# Patient Record
Sex: MALE | Race: WHITE | NOT HISPANIC OR LATINO | Employment: FULL TIME | URBAN - METROPOLITAN AREA
[De-identification: names, ages, dates, MRNs, and addresses within clinical notes are randomized per-mention and may not be internally consistent; named-entity substitution may affect disease eponyms.]

---

## 2022-07-26 ENCOUNTER — HOSPITAL ENCOUNTER (EMERGENCY)
Facility: MEDICAL CENTER | Age: 57
End: 2022-07-26
Attending: EMERGENCY MEDICINE
Payer: COMMERCIAL

## 2022-07-26 VITALS
SYSTOLIC BLOOD PRESSURE: 150 MMHG | HEIGHT: 76 IN | WEIGHT: 244.71 LBS | RESPIRATION RATE: 16 BRPM | TEMPERATURE: 98.5 F | HEART RATE: 85 BPM | OXYGEN SATURATION: 95 % | BODY MASS INDEX: 29.8 KG/M2 | DIASTOLIC BLOOD PRESSURE: 84 MMHG

## 2022-07-26 DIAGNOSIS — H53.8 BLURRED VISION: ICD-10-CM

## 2022-07-26 DIAGNOSIS — I10 PRIMARY HYPERTENSION: ICD-10-CM

## 2022-07-26 LAB
ALBUMIN SERPL BCP-MCNC: 3.8 G/DL (ref 3.2–4.9)
ALBUMIN/GLOB SERPL: 1.4 G/DL
ALP SERPL-CCNC: 106 U/L (ref 30–99)
ALT SERPL-CCNC: 23 U/L (ref 2–50)
ANION GAP SERPL CALC-SCNC: 10 MMOL/L (ref 7–16)
AST SERPL-CCNC: 14 U/L (ref 12–45)
BASOPHILS # BLD AUTO: 0.7 % (ref 0–1.8)
BASOPHILS # BLD: 0.08 K/UL (ref 0–0.12)
BILIRUB SERPL-MCNC: 0.2 MG/DL (ref 0.1–1.5)
BUN SERPL-MCNC: 8 MG/DL (ref 8–22)
CALCIUM SERPL-MCNC: 9 MG/DL (ref 8.5–10.5)
CHLORIDE SERPL-SCNC: 97 MMOL/L (ref 96–112)
CO2 SERPL-SCNC: 27 MMOL/L (ref 20–33)
CREAT SERPL-MCNC: 0.8 MG/DL (ref 0.5–1.4)
EOSINOPHIL # BLD AUTO: 0.33 K/UL (ref 0–0.51)
EOSINOPHIL NFR BLD: 3 % (ref 0–6.9)
ERYTHROCYTE [DISTWIDTH] IN BLOOD BY AUTOMATED COUNT: 38.2 FL (ref 35.9–50)
GFR SERPLBLD CREATININE-BSD FMLA CKD-EPI: 103 ML/MIN/1.73 M 2
GLOBULIN SER CALC-MCNC: 2.7 G/DL (ref 1.9–3.5)
GLUCOSE SERPL-MCNC: 348 MG/DL (ref 65–99)
HCT VFR BLD AUTO: 46 % (ref 42–52)
HGB BLD-MCNC: 15.4 G/DL (ref 14–18)
IMM GRANULOCYTES # BLD AUTO: 0.03 K/UL (ref 0–0.11)
IMM GRANULOCYTES NFR BLD AUTO: 0.3 % (ref 0–0.9)
LYMPHOCYTES # BLD AUTO: 3.29 K/UL (ref 1–4.8)
LYMPHOCYTES NFR BLD: 29.6 % (ref 22–41)
MCH RBC QN AUTO: 28.5 PG (ref 27–33)
MCHC RBC AUTO-ENTMCNC: 33.5 G/DL (ref 33.7–35.3)
MCV RBC AUTO: 85.2 FL (ref 81.4–97.8)
MONOCYTES # BLD AUTO: 0.6 K/UL (ref 0–0.85)
MONOCYTES NFR BLD AUTO: 5.4 % (ref 0–13.4)
NEUTROPHILS # BLD AUTO: 6.8 K/UL (ref 1.82–7.42)
NEUTROPHILS NFR BLD: 61 % (ref 44–72)
NRBC # BLD AUTO: 0 K/UL
NRBC BLD-RTO: 0 /100 WBC
PLATELET # BLD AUTO: 314 K/UL (ref 164–446)
PMV BLD AUTO: 9.7 FL (ref 9–12.9)
POTASSIUM SERPL-SCNC: 3.9 MMOL/L (ref 3.6–5.5)
PROT SERPL-MCNC: 6.5 G/DL (ref 6–8.2)
RBC # BLD AUTO: 5.4 M/UL (ref 4.7–6.1)
SODIUM SERPL-SCNC: 134 MMOL/L (ref 135–145)
TSH SERPL DL<=0.005 MIU/L-ACNC: 2.26 UIU/ML (ref 0.38–5.33)
WBC # BLD AUTO: 11.1 K/UL (ref 4.8–10.8)

## 2022-07-26 PROCEDURE — 80053 COMPREHEN METABOLIC PANEL: CPT

## 2022-07-26 PROCEDURE — 700102 HCHG RX REV CODE 250 W/ 637 OVERRIDE(OP): Performed by: EMERGENCY MEDICINE

## 2022-07-26 PROCEDURE — 85025 COMPLETE CBC W/AUTO DIFF WBC: CPT

## 2022-07-26 PROCEDURE — 36415 COLL VENOUS BLD VENIPUNCTURE: CPT

## 2022-07-26 PROCEDURE — A9270 NON-COVERED ITEM OR SERVICE: HCPCS | Performed by: EMERGENCY MEDICINE

## 2022-07-26 PROCEDURE — 99284 EMERGENCY DEPT VISIT MOD MDM: CPT

## 2022-07-26 PROCEDURE — 84443 ASSAY THYROID STIM HORMONE: CPT

## 2022-07-26 RX ORDER — METOPROLOL TARTRATE 1 MG/ML
5 INJECTION, SOLUTION INTRAVENOUS ONCE
Status: DISCONTINUED | OUTPATIENT
Start: 2022-07-26 | End: 2022-07-26

## 2022-07-26 RX ORDER — METOPROLOL SUCCINATE 25 MG/1
25 TABLET, EXTENDED RELEASE ORAL DAILY
Qty: 30 TABLET | Refills: 0 | Status: ON HOLD | OUTPATIENT
Start: 2022-07-26 | End: 2022-07-30

## 2022-07-26 RX ADMIN — METOPROLOL TARTRATE 25 MG: 25 TABLET, FILM COATED ORAL at 14:35

## 2022-07-26 ASSESSMENT — LIFESTYLE VARIABLES
EVER FELT BAD OR GUILTY ABOUT YOUR DRINKING: NO
EVER HAD A DRINK FIRST THING IN THE MORNING TO STEADY YOUR NERVES TO GET RID OF A HANGOVER: NO
TOTAL SCORE: 0
TOTAL SCORE: 0
HAVE PEOPLE ANNOYED YOU BY CRITICIZING YOUR DRINKING: NO
TOTAL SCORE: 0
DO YOU DRINK ALCOHOL: YES
CONSUMPTION TOTAL: INCOMPLETE
HAVE YOU EVER FELT YOU SHOULD CUT DOWN ON YOUR DRINKING: NO

## 2022-07-26 NOTE — ED NOTES
Visual acuity completed. Patient does not wear glasses or contacts   LEFT: 20/30 -2  RIGHT: 20/50  BOTH: 20/25 -1

## 2022-07-26 NOTE — DISCHARGE INSTRUCTIONS
Follow-up with a primary care provider within 1 week.  The  will contact you for an appointment.  Return to the emergency department if you are acutely worse.

## 2022-07-26 NOTE — ED NOTES
Pt sleeping. Oxygen 87% on RA. Placed pt on 2L to support while sleeping. O2 now 93%. Pt states he does have history of sleep apnea.

## 2022-07-26 NOTE — ED NOTES
Pt ambulatory to waiting room stable, with steady gait with all belongings. Verbalized understanding of discharge instructions, prescriptions to obtain and need to follow up.

## 2022-07-26 NOTE — ED TRIAGE NOTES
"Chief Complaint   Patient presents with   • Blurred Vision     3 days of blurred vision, lightheadedness. Both eyes equally affected. No new medications. Patient states he is under great stress because he recently became homeless.     ED Triage Vitals [07/26/22 1239]   Enc Vitals Group      Blood Pressure (!) 194/103      Pulse 98      Respiration 18      Temperature 36.7 °C (98.1 °F)      Temp src Oral      Pulse Oximetry 93 %      Weight 111 kg (244 lb 11.4 oz)      Height 1.93 m (6' 4\")     Noted to be hypertensive in triage, patient denies history of this. States he has no diagnosed medical problems but is concerned that \"thyroid issues\" run in his family.    Incidentally, patient was seen at [facility] for chest pain 8 days ago, but left before getting all of his results.  "

## 2022-07-26 NOTE — ED PROVIDER NOTES
"ED Provider Note    CHIEF COMPLAINT  Chief Complaint   Patient presents with   • Blurred Vision     3 days of blurred vision, lightheadedness. Both eyes equally affected. No new medications. Patient states he is under great stress because he recently became homeless.       HPI  Alireza Valenzuela is a 57 y.o. male who presents with blurred vision.  The patient states over last 3 days he has had blurred vision.  He states is binocular.  He does not have any double vision.  Does not have a headache.  He states recently he was at [facility] for chest pain about 8 to 9 days ago and was ruled out from a cardiac standpoint but he states he did leave early before he received all of his results.  He has not any chest pain or difficulty with breathing over the last 48 to 72 hours.  He is currently homeless and under a lot of stress.  He is unaware of any hypertension.    REVIEW OF SYSTEMS  See HPI for further details. All other systems are negative.     PAST MEDICAL HISTORY  Past Medical History:   Diagnosis Date   • Patient denies medical problems        FAMILY HISTORY  [unfilled]    SOCIAL HISTORY  Social History     Tobacco Use   • Smoking status: Current Every Day Smoker     Packs/day: 1.00     Types: Cigarettes   • Smokeless tobacco: Never Used   Substance and Sexual Activity   • Alcohol use: Yes     Comment: rare   • Drug use: Never       SURGICAL HISTORY  No past surgical history on file.    CURRENT MEDICATIONS  Home Medications     Reviewed by Deondre Sepulveda R.N. (Registered Nurse) on 07/26/22 at 1306  Med List Status: Complete   Medication Last Dose Status        Patient Flash Taking any Medications                       ALLERGIES  No Known Allergies    PHYSICAL EXAM  VITAL SIGNS: BP (S) (!) 186/116   Pulse 98   Temp 36.7 °C (98.1 °F) (Oral)   Resp 18   Ht 1.93 m (6' 4\")   Wt 111 kg (244 lb 11.4 oz)   SpO2 93%   BMI 29.79 kg/m²       Constitutional: Non-toxic appearance.   HENT: Normocephalic, Atraumatic, " Bilateral external ears normal, Oropharynx moist, No oral exudates, Nose normal.   Eyes: PERRLA, EOMI, Conjunctiva normal, No discharge.   Neck: Normal range of motion, No tenderness, Supple, No stridor.   Lymphatic: No lymphadenopathy noted.   Cardiovascular: Normal heart rate, Normal rhythm, No murmurs, No rubs, No gallops.   Thorax & Lungs: Normal breath sounds, No respiratory distress, No wheezing, No chest tenderness.   Abdomen: Bowel sounds normal, Soft, No tenderness, No masses, No pulsatile masses.   Skin: Warm, Dry, No erythema, No rash.   Back: No tenderness, No CVA tenderness. .   Extremities: Intact distal pulses, No edema, No tenderness, No cyanosis, No clubbing.   Neurologic: Alert & oriented x 3, Normal motor function, Normal sensory function, No focal deficits noted.   Psychiatric: Affect normal, Judgment normal, Mood normal.     Results for orders placed or performed during the hospital encounter of 07/26/22   CBC WITH DIFFERENTIAL   Result Value Ref Range    WBC 11.1 (H) 4.8 - 10.8 K/uL    RBC 5.40 4.70 - 6.10 M/uL    Hemoglobin 15.4 14.0 - 18.0 g/dL    Hematocrit 46.0 42.0 - 52.0 %    MCV 85.2 81.4 - 97.8 fL    MCH 28.5 27.0 - 33.0 pg    MCHC 33.5 (L) 33.7 - 35.3 g/dL    RDW 38.2 35.9 - 50.0 fL    Platelet Count 314 164 - 446 K/uL    MPV 9.7 9.0 - 12.9 fL    Neutrophils-Polys 61.00 44.00 - 72.00 %    Lymphocytes 29.60 22.00 - 41.00 %    Monocytes 5.40 0.00 - 13.40 %    Eosinophils 3.00 0.00 - 6.90 %    Basophils 0.70 0.00 - 1.80 %    Immature Granulocytes 0.30 0.00 - 0.90 %    Nucleated RBC 0.00 /100 WBC    Neutrophils (Absolute) 6.80 1.82 - 7.42 K/uL    Lymphs (Absolute) 3.29 1.00 - 4.80 K/uL    Monos (Absolute) 0.60 0.00 - 0.85 K/uL    Eos (Absolute) 0.33 0.00 - 0.51 K/uL    Baso (Absolute) 0.08 0.00 - 0.12 K/uL    Immature Granulocytes (abs) 0.03 0.00 - 0.11 K/uL    NRBC (Absolute) 0.00 K/uL   COMP METABOLIC PANEL   Result Value Ref Range    Sodium 134 (L) 135 - 145 mmol/L    Potassium 3.9 3.6  - 5.5 mmol/L    Chloride 97 96 - 112 mmol/L    Co2 27 20 - 33 mmol/L    Anion Gap 10.0 7.0 - 16.0    Glucose 348 (H) 65 - 99 mg/dL    Bun 8 8 - 22 mg/dL    Creatinine 0.80 0.50 - 1.40 mg/dL    Calcium 9.0 8.5 - 10.5 mg/dL    AST(SGOT) 14 12 - 45 U/L    ALT(SGPT) 23 2 - 50 U/L    Alkaline Phosphatase 106 (H) 30 - 99 U/L    Total Bilirubin 0.2 0.1 - 1.5 mg/dL    Albumin 3.8 3.2 - 4.9 g/dL    Total Protein 6.5 6.0 - 8.2 g/dL    Globulin 2.7 1.9 - 3.5 g/dL    A-G Ratio 1.4 g/dL   TSH   Result Value Ref Range    TSH 2.260 0.380 - 5.330 uIU/mL   ESTIMATED GFR   Result Value Ref Range    GFR (CKD-EPI) 103 >60 mL/min/1.73 m 2         COURSE & MEDICAL DECISION MAKING  Pertinent Labs & Imaging studies reviewed. (See chart for details)  This a 57-year-old male who presents emergency department with decreased vision bilaterally.  The patient presents significantly hypertensive and I suspect this could be the source.  He does not have any monocular findings to support an intracranial source.  The patient does not have any visual field cuts to support retinal detachment.  Laboratory analysis was obtained does not have any renal insufficiency and I suspect he does have undiagnosed hypertension and most likely undiagnosed diabetes mellitus as his blood sugar is significantly elevated.  Initially ordered Lopressor intravenously however on repeat exam his blood pressure has come down.  The patient therefore received oral metoprolol and we will start the patient on metoprolol for hypertension.  I will contact the scheduling department to see if they can get the patient in for follow-up within 7 to 10 days for repeat blood pressure check as well as to follow-up on his blood sugars I suspect he may also have diabetes myelitis.  I suspect the poorly controlled blood pressure as well as possible diabetes is causing his decreased in vision.  He will follow-up with the ophthalmologist and will return if he is acutely worse.    FINAL  IMPRESSION  1.  Blurred vision  2.  Hypertension  3.  Hyperglycemia suspect diabetes mellitus    Disposition  The patient will be discharged in stable condition         Electronically signed by: Blayne Shaffer M.D., 7/26/2022 1:43 PM

## 2022-07-27 ENCOUNTER — APPOINTMENT (OUTPATIENT)
Dept: RADIOLOGY | Facility: MEDICAL CENTER | Age: 57
DRG: 065 | End: 2022-07-27
Attending: EMERGENCY MEDICINE
Payer: COMMERCIAL

## 2022-07-27 ENCOUNTER — HOSPITAL ENCOUNTER (INPATIENT)
Facility: MEDICAL CENTER | Age: 57
LOS: 2 days | DRG: 065 | End: 2022-07-30
Attending: EMERGENCY MEDICINE | Admitting: STUDENT IN AN ORGANIZED HEALTH CARE EDUCATION/TRAINING PROGRAM
Payer: COMMERCIAL

## 2022-07-27 ENCOUNTER — APPOINTMENT (OUTPATIENT)
Dept: RADIOLOGY | Facility: MEDICAL CENTER | Age: 57
DRG: 065 | End: 2022-07-27
Attending: STUDENT IN AN ORGANIZED HEALTH CARE EDUCATION/TRAINING PROGRAM
Payer: COMMERCIAL

## 2022-07-27 ENCOUNTER — PATIENT OUTREACH (OUTPATIENT)
Dept: SCHEDULING | Facility: IMAGING CENTER | Age: 57
End: 2022-07-27
Payer: COMMERCIAL

## 2022-07-27 DIAGNOSIS — I63.9 CEREBROVASCULAR ACCIDENT (CVA), UNSPECIFIED MECHANISM (HCC): ICD-10-CM

## 2022-07-27 DIAGNOSIS — R56.9 SEIZURE (HCC): ICD-10-CM

## 2022-07-27 DIAGNOSIS — E11.65 UNCONTROLLED TYPE 2 DIABETES MELLITUS WITH HYPERGLYCEMIA (HCC): ICD-10-CM

## 2022-07-27 DIAGNOSIS — I63.9 ACUTE CVA (CEREBROVASCULAR ACCIDENT) (HCC): ICD-10-CM

## 2022-07-27 DIAGNOSIS — R26.89 BALANCE PROBLEM: ICD-10-CM

## 2022-07-27 DIAGNOSIS — E78.1 HYPERTRIGLYCERIDEMIA: ICD-10-CM

## 2022-07-27 DIAGNOSIS — H53.2 DIPLOPIA: ICD-10-CM

## 2022-07-27 DIAGNOSIS — R51.9 NONINTRACTABLE HEADACHE, UNSPECIFIED CHRONICITY PATTERN, UNSPECIFIED HEADACHE TYPE: ICD-10-CM

## 2022-07-27 PROBLEM — R79.89 ELEVATED TROPONIN: Status: ACTIVE | Noted: 2022-07-27

## 2022-07-27 PROBLEM — Z72.0 TOBACCO ABUSE: Status: ACTIVE | Noted: 2022-07-27

## 2022-07-27 LAB
ABO GROUP BLD: NORMAL
ALBUMIN SERPL BCP-MCNC: 4.1 G/DL (ref 3.2–4.9)
ALBUMIN/GLOB SERPL: 1.6 G/DL
ALP SERPL-CCNC: 108 U/L (ref 30–99)
ALT SERPL-CCNC: 21 U/L (ref 2–50)
ANION GAP SERPL CALC-SCNC: 13 MMOL/L (ref 7–16)
APTT PPP: 28.2 SEC (ref 24.7–36)
AST SERPL-CCNC: 14 U/L (ref 12–45)
BASOPHILS # BLD AUTO: 0.7 % (ref 0–1.8)
BASOPHILS # BLD AUTO: 0.7 % (ref 0–1.8)
BASOPHILS # BLD: 0.09 K/UL (ref 0–0.12)
BASOPHILS # BLD: 0.09 K/UL (ref 0–0.12)
BILIRUB SERPL-MCNC: 0.2 MG/DL (ref 0.1–1.5)
BLD GP AB SCN SERPL QL: NORMAL
BUN SERPL-MCNC: 7 MG/DL (ref 8–22)
CALCIUM SERPL-MCNC: 9.4 MG/DL (ref 8.5–10.5)
CHLORIDE SERPL-SCNC: 99 MMOL/L (ref 96–112)
CO2 SERPL-SCNC: 24 MMOL/L (ref 20–33)
CREAT SERPL-MCNC: 0.83 MG/DL (ref 0.5–1.4)
EKG IMPRESSION: NORMAL
EOSINOPHIL # BLD AUTO: 0.3 K/UL (ref 0–0.51)
EOSINOPHIL # BLD AUTO: 0.3 K/UL (ref 0–0.51)
EOSINOPHIL NFR BLD: 2.5 % (ref 0–6.9)
EOSINOPHIL NFR BLD: 2.5 % (ref 0–6.9)
ERYTHROCYTE [DISTWIDTH] IN BLOOD BY AUTOMATED COUNT: 38 FL (ref 35.9–50)
ERYTHROCYTE [DISTWIDTH] IN BLOOD BY AUTOMATED COUNT: 38 FL (ref 35.9–50)
GFR SERPLBLD CREATININE-BSD FMLA CKD-EPI: 102 ML/MIN/1.73 M 2
GLOBULIN SER CALC-MCNC: 2.6 G/DL (ref 1.9–3.5)
GLUCOSE BLD STRIP.AUTO-MCNC: 389 MG/DL (ref 65–99)
GLUCOSE SERPL-MCNC: 413 MG/DL (ref 65–99)
HCT VFR BLD AUTO: 47 % (ref 42–52)
HCT VFR BLD AUTO: 47 % (ref 42–52)
HGB BLD-MCNC: 16.2 G/DL (ref 14–18)
HGB BLD-MCNC: 16.2 G/DL (ref 14–18)
IMM GRANULOCYTES # BLD AUTO: 0.03 K/UL (ref 0–0.11)
IMM GRANULOCYTES # BLD AUTO: 0.03 K/UL (ref 0–0.11)
IMM GRANULOCYTES NFR BLD AUTO: 0.2 % (ref 0–0.9)
IMM GRANULOCYTES NFR BLD AUTO: 0.2 % (ref 0–0.9)
INR PPP: 1.03 (ref 0.87–1.13)
LYMPHOCYTES # BLD AUTO: 3.57 K/UL (ref 1–4.8)
LYMPHOCYTES # BLD AUTO: 3.57 K/UL (ref 1–4.8)
LYMPHOCYTES NFR BLD: 29.6 % (ref 22–41)
LYMPHOCYTES NFR BLD: 29.6 % (ref 22–41)
MCH RBC QN AUTO: 29.2 PG (ref 27–33)
MCH RBC QN AUTO: 29.2 PG (ref 27–33)
MCHC RBC AUTO-ENTMCNC: 34.5 G/DL (ref 33.7–35.3)
MCHC RBC AUTO-ENTMCNC: 34.5 G/DL (ref 33.7–35.3)
MCV RBC AUTO: 84.7 FL (ref 81.4–97.8)
MCV RBC AUTO: 84.7 FL (ref 81.4–97.8)
MONOCYTES # BLD AUTO: 0.74 K/UL (ref 0–0.85)
MONOCYTES # BLD AUTO: 0.74 K/UL (ref 0–0.85)
MONOCYTES NFR BLD AUTO: 6.1 % (ref 0–13.4)
MONOCYTES NFR BLD AUTO: 6.1 % (ref 0–13.4)
NEUTROPHILS # BLD AUTO: 7.33 K/UL (ref 1.82–7.42)
NEUTROPHILS # BLD AUTO: 7.33 K/UL (ref 1.82–7.42)
NEUTROPHILS NFR BLD: 60.9 % (ref 44–72)
NEUTROPHILS NFR BLD: 60.9 % (ref 44–72)
NRBC # BLD AUTO: 0 K/UL
NRBC # BLD AUTO: 0 K/UL
NRBC BLD-RTO: 0 /100 WBC
NRBC BLD-RTO: 0 /100 WBC
PLATELET # BLD AUTO: 315 K/UL (ref 164–446)
PLATELET # BLD AUTO: 315 K/UL (ref 164–446)
PMV BLD AUTO: 10 FL (ref 9–12.9)
PMV BLD AUTO: 10 FL (ref 9–12.9)
POTASSIUM SERPL-SCNC: 4 MMOL/L (ref 3.6–5.5)
PROT SERPL-MCNC: 6.7 G/DL (ref 6–8.2)
PROTHROMBIN TIME: 13.4 SEC (ref 12–14.6)
RBC # BLD AUTO: 5.55 M/UL (ref 4.7–6.1)
RBC # BLD AUTO: 5.55 M/UL (ref 4.7–6.1)
RH BLD: NORMAL
SODIUM SERPL-SCNC: 136 MMOL/L (ref 135–145)
TROPONIN T SERPL-MCNC: 103 NG/L (ref 6–19)
TROPONIN T SERPL-MCNC: 105 NG/L (ref 6–19)
WBC # BLD AUTO: 12.1 K/UL (ref 4.8–10.8)
WBC # BLD AUTO: 12.1 K/UL (ref 4.8–10.8)

## 2022-07-27 PROCEDURE — G0378 HOSPITAL OBSERVATION PER HR: HCPCS

## 2022-07-27 PROCEDURE — 71045 X-RAY EXAM CHEST 1 VIEW: CPT

## 2022-07-27 PROCEDURE — 99220 PR INITIAL OBSERVATION CARE,LEVL III: CPT | Performed by: STUDENT IN AN ORGANIZED HEALTH CARE EDUCATION/TRAINING PROGRAM

## 2022-07-27 PROCEDURE — 96372 THER/PROPH/DIAG INJ SC/IM: CPT

## 2022-07-27 PROCEDURE — 70498 CT ANGIOGRAPHY NECK: CPT

## 2022-07-27 PROCEDURE — 93005 ELECTROCARDIOGRAM TRACING: CPT | Performed by: EMERGENCY MEDICINE

## 2022-07-27 PROCEDURE — 86901 BLOOD TYPING SEROLOGIC RH(D): CPT

## 2022-07-27 PROCEDURE — 70496 CT ANGIOGRAPHY HEAD: CPT

## 2022-07-27 PROCEDURE — 80053 COMPREHEN METABOLIC PANEL: CPT

## 2022-07-27 PROCEDURE — 86900 BLOOD TYPING SEROLOGIC ABO: CPT

## 2022-07-27 PROCEDURE — 86850 RBC ANTIBODY SCREEN: CPT

## 2022-07-27 PROCEDURE — 85025 COMPLETE CBC W/AUTO DIFF WBC: CPT

## 2022-07-27 PROCEDURE — 700117 HCHG RX CONTRAST REV CODE 255: Performed by: EMERGENCY MEDICINE

## 2022-07-27 PROCEDURE — 96374 THER/PROPH/DIAG INJ IV PUSH: CPT

## 2022-07-27 PROCEDURE — 85610 PROTHROMBIN TIME: CPT

## 2022-07-27 PROCEDURE — 36415 COLL VENOUS BLD VENIPUNCTURE: CPT

## 2022-07-27 PROCEDURE — 85730 THROMBOPLASTIN TIME PARTIAL: CPT

## 2022-07-27 PROCEDURE — 99285 EMERGENCY DEPT VISIT HI MDM: CPT

## 2022-07-27 PROCEDURE — 84484 ASSAY OF TROPONIN QUANT: CPT | Mod: 91

## 2022-07-27 PROCEDURE — 82962 GLUCOSE BLOOD TEST: CPT

## 2022-07-27 PROCEDURE — 700102 HCHG RX REV CODE 250 W/ 637 OVERRIDE(OP): Performed by: STUDENT IN AN ORGANIZED HEALTH CARE EDUCATION/TRAINING PROGRAM

## 2022-07-27 PROCEDURE — 700101 HCHG RX REV CODE 250: Performed by: EMERGENCY MEDICINE

## 2022-07-27 PROCEDURE — 80061 LIPID PANEL: CPT

## 2022-07-27 RX ORDER — GUAIFENESIN/PHENYLPROPANOLAMIN
1 EXPECTORANT ORAL DAILY
COMMUNITY

## 2022-07-27 RX ORDER — LABETALOL HYDROCHLORIDE 5 MG/ML
10 INJECTION, SOLUTION INTRAVENOUS EVERY 4 HOURS PRN
Status: DISCONTINUED | OUTPATIENT
Start: 2022-07-27 | End: 2022-07-30 | Stop reason: HOSPADM

## 2022-07-27 RX ORDER — ACETAMINOPHEN 325 MG/1
650 TABLET ORAL EVERY 6 HOURS PRN
Status: DISCONTINUED | OUTPATIENT
Start: 2022-07-27 | End: 2022-07-30 | Stop reason: HOSPADM

## 2022-07-27 RX ORDER — HEPARIN SODIUM 5000 [USP'U]/ML
5000 INJECTION, SOLUTION INTRAVENOUS; SUBCUTANEOUS EVERY 8 HOURS
Status: DISCONTINUED | OUTPATIENT
Start: 2022-07-27 | End: 2022-07-29

## 2022-07-27 RX ORDER — LISINOPRIL 10 MG/1
10 TABLET ORAL
Status: DISCONTINUED | OUTPATIENT
Start: 2022-07-28 | End: 2022-07-30 | Stop reason: HOSPADM

## 2022-07-27 RX ORDER — LABETALOL HYDROCHLORIDE 5 MG/ML
10 INJECTION, SOLUTION INTRAVENOUS ONCE
Status: COMPLETED | OUTPATIENT
Start: 2022-07-27 | End: 2022-07-27

## 2022-07-27 RX ORDER — DEXTROSE MONOHYDRATE 25 G/50ML
25 INJECTION, SOLUTION INTRAVENOUS
Status: DISCONTINUED | OUTPATIENT
Start: 2022-07-27 | End: 2022-07-30 | Stop reason: HOSPADM

## 2022-07-27 RX ADMIN — IOHEXOL 80 ML: 350 INJECTION, SOLUTION INTRAVENOUS at 21:15

## 2022-07-27 RX ADMIN — LABETALOL HYDROCHLORIDE 10 MG: 5 INJECTION, SOLUTION INTRAVENOUS at 22:01

## 2022-07-27 ASSESSMENT — ENCOUNTER SYMPTOMS
COUGH: 1
CARDIOVASCULAR NEGATIVE: 1
NEUROLOGICAL NEGATIVE: 1
CHILLS: 0
VOMITING: 0
DOUBLE VISION: 1
RESPIRATORY NEGATIVE: 1
FEVER: 0
MUSCULOSKELETAL NEGATIVE: 1
HEADACHES: 1
NAUSEA: 0
GASTROINTESTINAL NEGATIVE: 1
PSYCHIATRIC NEGATIVE: 1

## 2022-07-27 ASSESSMENT — FIBROSIS 4 INDEX: FIB4 SCORE: 0.53

## 2022-07-28 ENCOUNTER — APPOINTMENT (OUTPATIENT)
Dept: CARDIOLOGY | Facility: MEDICAL CENTER | Age: 57
DRG: 065 | End: 2022-07-28
Attending: NURSE PRACTITIONER
Payer: COMMERCIAL

## 2022-07-28 PROBLEM — I63.9 RECURRENT CEREBROVASCULAR ACCIDENTS (CVAS) (HCC): Status: ACTIVE | Noted: 2022-07-28

## 2022-07-28 LAB
CHOLEST SERPL-MCNC: 219 MG/DL (ref 100–199)
GLUCOSE BLD STRIP.AUTO-MCNC: 237 MG/DL (ref 65–99)
GLUCOSE BLD STRIP.AUTO-MCNC: 242 MG/DL (ref 65–99)
GLUCOSE BLD STRIP.AUTO-MCNC: 256 MG/DL (ref 65–99)
GLUCOSE BLD STRIP.AUTO-MCNC: 267 MG/DL (ref 65–99)
GLUCOSE BLD STRIP.AUTO-MCNC: 275 MG/DL (ref 65–99)
HDLC SERPL-MCNC: 28 MG/DL
LDLC SERPL CALC-MCNC: ABNORMAL MG/DL
TRIGL SERPL-MCNC: 515 MG/DL (ref 0–149)
TROPONIN T SERPL-MCNC: 94 NG/L (ref 6–19)

## 2022-07-28 PROCEDURE — 99233 SBSQ HOSP IP/OBS HIGH 50: CPT | Performed by: INTERNAL MEDICINE

## 2022-07-28 PROCEDURE — A9270 NON-COVERED ITEM OR SERVICE: HCPCS | Performed by: NURSE PRACTITIONER

## 2022-07-28 PROCEDURE — 97162 PT EVAL MOD COMPLEX 30 MIN: CPT

## 2022-07-28 PROCEDURE — 96375 TX/PRO/DX INJ NEW DRUG ADDON: CPT

## 2022-07-28 PROCEDURE — 93306 TTE W/DOPPLER COMPLETE: CPT

## 2022-07-28 PROCEDURE — 99223 1ST HOSP IP/OBS HIGH 75: CPT | Mod: FS | Performed by: NURSE PRACTITIONER

## 2022-07-28 PROCEDURE — 70551 MRI BRAIN STEM W/O DYE: CPT

## 2022-07-28 PROCEDURE — 82962 GLUCOSE BLOOD TEST: CPT | Mod: 91

## 2022-07-28 PROCEDURE — 97165 OT EVAL LOW COMPLEX 30 MIN: CPT

## 2022-07-28 PROCEDURE — 700102 HCHG RX REV CODE 250 W/ 637 OVERRIDE(OP): Performed by: NURSE PRACTITIONER

## 2022-07-28 PROCEDURE — 700102 HCHG RX REV CODE 250 W/ 637 OVERRIDE(OP): Performed by: STUDENT IN AN ORGANIZED HEALTH CARE EDUCATION/TRAINING PROGRAM

## 2022-07-28 PROCEDURE — A9270 NON-COVERED ITEM OR SERVICE: HCPCS | Performed by: INTERNAL MEDICINE

## 2022-07-28 PROCEDURE — 84484 ASSAY OF TROPONIN QUANT: CPT

## 2022-07-28 PROCEDURE — 700102 HCHG RX REV CODE 250 W/ 637 OVERRIDE(OP): Performed by: INTERNAL MEDICINE

## 2022-07-28 PROCEDURE — 96372 THER/PROPH/DIAG INJ SC/IM: CPT

## 2022-07-28 PROCEDURE — 700111 HCHG RX REV CODE 636 W/ 250 OVERRIDE (IP): Performed by: STUDENT IN AN ORGANIZED HEALTH CARE EDUCATION/TRAINING PROGRAM

## 2022-07-28 PROCEDURE — A9270 NON-COVERED ITEM OR SERVICE: HCPCS | Performed by: STUDENT IN AN ORGANIZED HEALTH CARE EDUCATION/TRAINING PROGRAM

## 2022-07-28 PROCEDURE — 770020 HCHG ROOM/CARE - TELE (206)

## 2022-07-28 RX ORDER — LORAZEPAM 2 MG/ML
0.5 INJECTION INTRAMUSCULAR ONCE
Status: DISCONTINUED | OUTPATIENT
Start: 2022-07-28 | End: 2022-07-28

## 2022-07-28 RX ORDER — FENOFIBRATE 67 MG/1
67 CAPSULE ORAL DAILY
Status: DISCONTINUED | OUTPATIENT
Start: 2022-07-28 | End: 2022-07-30 | Stop reason: HOSPADM

## 2022-07-28 RX ORDER — ATORVASTATIN CALCIUM 80 MG/1
80 TABLET, FILM COATED ORAL EVERY EVENING
Status: DISCONTINUED | OUTPATIENT
Start: 2022-07-28 | End: 2022-07-30 | Stop reason: HOSPADM

## 2022-07-28 RX ORDER — MIDAZOLAM HYDROCHLORIDE 1 MG/ML
1 INJECTION INTRAMUSCULAR; INTRAVENOUS ONCE
Status: COMPLETED | OUTPATIENT
Start: 2022-07-28 | End: 2022-07-28

## 2022-07-28 RX ORDER — CLOPIDOGREL BISULFATE 75 MG/1
75 TABLET ORAL DAILY
Status: DISCONTINUED | OUTPATIENT
Start: 2022-07-28 | End: 2022-07-30 | Stop reason: HOSPADM

## 2022-07-28 RX ORDER — ASPIRIN 81 MG/1
81 TABLET, CHEWABLE ORAL DAILY
Status: DISCONTINUED | OUTPATIENT
Start: 2022-07-28 | End: 2022-07-30 | Stop reason: HOSPADM

## 2022-07-28 RX ADMIN — HEPARIN SODIUM 5000 UNITS: 5000 INJECTION, SOLUTION INTRAVENOUS; SUBCUTANEOUS at 22:06

## 2022-07-28 RX ADMIN — FENOFIBRATE 67 MG: 67 CAPSULE ORAL at 09:02

## 2022-07-28 RX ADMIN — INSULIN HUMAN 2 UNITS: 100 INJECTION, SOLUTION PARENTERAL at 20:42

## 2022-07-28 RX ADMIN — INSULIN HUMAN 2 UNITS: 100 INJECTION, SOLUTION PARENTERAL at 17:38

## 2022-07-28 RX ADMIN — ATORVASTATIN CALCIUM 80 MG: 80 TABLET, FILM COATED ORAL at 17:38

## 2022-07-28 RX ADMIN — INSULIN HUMAN 3 UNITS: 100 INJECTION, SOLUTION PARENTERAL at 08:59

## 2022-07-28 RX ADMIN — HEPARIN SODIUM 5000 UNITS: 5000 INJECTION, SOLUTION INTRAVENOUS; SUBCUTANEOUS at 15:23

## 2022-07-28 RX ADMIN — ASPIRIN 81 MG: 81 TABLET, CHEWABLE ORAL at 08:59

## 2022-07-28 RX ADMIN — LISINOPRIL 10 MG: 10 TABLET ORAL at 05:27

## 2022-07-28 RX ADMIN — CLOPIDOGREL BISULFATE 75 MG: 75 TABLET ORAL at 17:37

## 2022-07-28 RX ADMIN — MIDAZOLAM HYDROCHLORIDE 1 MG: 1 INJECTION, SOLUTION INTRAMUSCULAR; INTRAVENOUS at 00:51

## 2022-07-28 RX ADMIN — INSULIN HUMAN 3 UNITS: 100 INJECTION, SOLUTION PARENTERAL at 12:26

## 2022-07-28 ASSESSMENT — ENCOUNTER SYMPTOMS
FOCAL WEAKNESS: 0
BLURRED VISION: 1
NERVOUS/ANXIOUS: 0
NAUSEA: 0
TREMORS: 0
BACK PAIN: 0
VOMITING: 0
MYALGIAS: 0
PALPITATIONS: 0
SEIZURES: 0
FLANK PAIN: 0
DEPRESSION: 0
PHOTOPHOBIA: 0
WEAKNESS: 0
ABDOMINAL PAIN: 0
COUGH: 0
DIAPHORESIS: 0
DIZZINESS: 0
HEADACHES: 0
FEVER: 0
MEMORY LOSS: 0
SENSORY CHANGE: 0
EYE PAIN: 0
SHORTNESS OF BREATH: 0
CHILLS: 0
DOUBLE VISION: 1
SPEECH CHANGE: 0
LOSS OF CONSCIOUSNESS: 0

## 2022-07-28 ASSESSMENT — COGNITIVE AND FUNCTIONAL STATUS - GENERAL
MOBILITY SCORE: 24
SUGGESTED CMS G CODE MODIFIER DAILY ACTIVITY: CH
SUGGESTED CMS G CODE MODIFIER MOBILITY: CH
DAILY ACTIVITIY SCORE: 24

## 2022-07-28 ASSESSMENT — GAIT ASSESSMENTS
DISTANCE (FEET): 80
GAIT LEVEL OF ASSIST: SUPERVISED

## 2022-07-28 ASSESSMENT — ACTIVITIES OF DAILY LIVING (ADL): TOILETING: INDEPENDENT

## 2022-07-28 NOTE — ED NOTES
"Pt. Alert and oriented X 4, resting in bed. States \"I just barely opened my eyes but I think my vision is doing a little bit better.\" Denies further needs at this time. Monitors remain in place. Call light in reach.   "

## 2022-07-28 NOTE — ED NOTES
Pt. Ambulatory back to bed. Continues to deny dizziness. Monitors placed. Medications given and labs drawn.

## 2022-07-28 NOTE — ED NOTES
Pt back from MRI, during MRI pt anxious before start. MRI called this RN, MD notified. Pt medicated per MAR, education provided, pt verbalized understanding. Pt placed on 2L O2 NC d/t medication administration, VSS during MRI. Pt hooked back onto monitor.

## 2022-07-28 NOTE — ED NOTES
Med Rec Complete per patient  Allergies Reviewed with patient  No antibiotics within the last 30 days  Patient's Preferred Pharmacy: [facility] in [place], [place]
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
## 2022-07-28 NOTE — ED NOTES
Pt medicated per MAR, education provided, pt verbalized understanding. Switched over to hospital bed.

## 2022-07-28 NOTE — ED NOTES
Pt. Resting in bed. Respirations even and unlabored. VSS. No needs voiced or identified at this time.

## 2022-07-28 NOTE — PROGRESS NOTES
4 Eyes Skin Assessment Completed by Dilma, RN and LIZZETH Cote.    Head WDL  Ears WDL  Nose WDL  Mouth WDL  Neck WDL  Breast/Chest WDL  Shoulder Blades WDL  Spine WDL  (R) Arm/Elbow/Hand WDL  (L) Arm/Elbow/Hand WDL  Abdomen WDL  Groin WDL  Scrotum/Coccyx/Buttocks WDL  (R) Leg WDL  (L) Leg WDL  (R) Heel/Foot/Toe WDL  (L) Heel/Foot/Toe WDL          Devices In Places Tele Box, Pulse Ox and SCD's      Interventions In Place Pillows and Pressure Redistribution Mattress    Possible Skin Injury No    Pictures Uploaded Into Epic N/A  Wound Consult Placed N/A  RN Wound Prevention Protocol Ordered No

## 2022-07-28 NOTE — THERAPY
Physical Therapy   Initial Evaluation     Patient Name: Alireza Valenzuela  Age:  57 y.o., Sex:  male  Medical Record #: [id number]  Today's Date: 7/28/2022     Precautions  Precautions:  (none)    Assessment  Patient is 57 y.o. male who presents to the ED with stroke like symptoms, blurry vision and headache for the past 4 days. In ED, pt was hypertensive. Imaging revealed an area of low density in the left parietal lobe suggesting area of prior infarct. Pt also has uncontrolled DM and is a smoker. Pt was able to mobilize as detailed below indicating that he currently has no acute care physical therapy needs. No equipment needed.     Plan    Recommend Physical Therapy for Evaluation only.    DC Equipment Recommendations: None  Discharge Recommendations: Anticipate that the patient will have no further physical therapy needs after discharge from the hospital       Objective       07/28/22 0975   Initial Contact Note    Initial Contact Note Order Received and Verified, Evaluation Only - Patient Does Not Require Further Acute Physical Therapy at this Time.  However, May Benefit from Post Acute Therapy for Higher Level Functional Deficits.   Precautions   Precautions   (none)   Pain 0 - 10 Group   Therapist Pain Assessment Post Activity Pain Same as Prior to Activity;Nurse Notified;0   Prior Living Situation   Prior Services Home-Independent   Housing / Facility 1 Story House   Steps Into Home 0   Steps In Home 0   Equipment Owned None   Lives with - Patient's Self Care Capacity Alone and Able to Care For Self   Comments Pt to DC to dtr in [place] who also has a single level home   Prior Level of Functional Mobility   Bed Mobility Independent   Transfer Status Independent   Ambulation Independent   Distance Ambulation (Feet)   (community)   Assistive Devices Used None   Stairs Independent   Cognition    Cognition / Consciousness WDL   Level of Consciousness Alert   Passive ROM Lower Body   Passive ROM Lower Body  WDL   Active ROM Lower Body    Active ROM Lower Body  WDL   Comments Pt has R foot drop but is able to safely compensate   Strength Lower Body   Lower Body Strength  WDL, 5/5 B LE except R DF    Sensation Lower Body   Lower Extremity Sensation   WDL   Lower Body Muscle Tone   Lower Body Muscle Tone  WDL   Neurological Concerns   Comments Pt states he no longer has any of the stroke like symptoms he had when he presented. Pt has good balance with ambulation with multiple balance challenges   Balance Assessment   Sitting Balance (Static) Good   Sitting Balance (Dynamic) Good   Standing Balance (Static) Good   Standing Balance (Dynamic) Good   Weight Shift Sitting Good   Weight Shift Standing Good   Gait Analysis   Gait Level Of Assist Supervised   Assistive Device None   Distance (Feet) 80   # of Times Distance was Traveled 1   Deviation   (R foot drop but able to adequately compensate)   Level of Assist with Stairs   (Pt demonstrated appropriate balance and strength to complete a full flight of stairs as needed)   Weight Bearing Status no restrictions   Bed Mobility    Supine to Sit Independent   Sit to Supine Independent   Scooting Independent   Rolling Independent   Functional Mobility   Sit to Stand Supervised   Bed, Chair, Wheelchair Transfer Supervised   Mobility sup to sit, sts, gait, left with OTR   How much difficulty does the patient currently have...   Turning over in bed (including adjusting bedclothes, sheets and blankets)? 4   Sitting down on and standing up from a chair with arms (e.g., wheelchair, bedside commode, etc.) 4   Moving from lying on back to sitting on the side of the bed? 4   How much help from another person does the patient currently need...   Moving to and from a bed to a chair (including a wheelchair)? 4   Need to walk in a hospital room? 4   Climbing 3-5 steps with a railing? 4   6 clicks Mobility Score 24   Activity Tolerance   Sitting Edge of Bed 2 min   Standing 8 min   Anticipated  Discharge Equipment and Recommendations   DC Equipment Recommendations None   Discharge Recommendations Anticipate that the patient will have no further physical therapy needs after discharge from the hospital   Interdisciplinary Plan of Care Collaboration   IDT Collaboration with  Nursing;Occupational Therapist   Patient Position at End of Therapy Edge of Bed;Phone within Reach;Tray Table within Reach;Call Light within Reach   Collaboration Comments PT findings   Session Information   Date / Session Number  7/28- eval only

## 2022-07-28 NOTE — PROGRESS NOTES
Hospital Medicine Daily Progress Note    Date of Service  7/28/2022    Chief Complaint  Alireza Valenzuela is a 57 y.o. male admitted 7/27/2022 with cerebrovascular accident.    Hospital Course  57-year-old male admitted for CVA, a of the head with evidence of prior infarct.  Admitted for stroke work-up.  Noted hyperglycemia and dyslipidemia.    Interval Problem Update    Feels better, no focal weakness, ongoing blurry vision    I have discussed this patient's plan of care and discharge plan at IDT rounds today with Case Management, Nursing, Nursing leadership, and other members of the IDT team.    Consultants/Specialty  neurology    Code Status  Full Code    Disposition  Patient is not medically cleared for discharge.   Anticipate discharge to to home with close outpatient follow-up.  I have placed the appropriate orders for post-discharge needs.    Review of Systems  Review of Systems   Constitutional: Negative for chills, diaphoresis, fever and malaise/fatigue.   HENT: Negative for congestion and hearing loss.    Eyes: Positive for blurred vision and double vision. Negative for photophobia and pain.   Respiratory: Negative for cough and shortness of breath.    Cardiovascular: Negative for chest pain, palpitations and leg swelling.   Gastrointestinal: Negative for abdominal pain, nausea and vomiting.   Genitourinary: Negative for dysuria and flank pain.   Musculoskeletal: Negative for back pain, joint pain and myalgias.   Neurological: Negative for dizziness, tremors, sensory change, speech change, focal weakness, seizures, loss of consciousness, weakness and headaches.   Psychiatric/Behavioral: Negative for depression and memory loss. The patient is not nervous/anxious.         Physical Exam  Temp:  [36.4 °C (97.5 °F)-36.7 °C (98.1 °F)] 36.7 °C (98.1 °F)  Pulse:  [] 89  Resp:  [14-27] 16  BP: (112-187)/() 155/81  SpO2:  [88 %-95 %] 89 %    Physical Exam  Vitals and nursing note reviewed.    Constitutional:       General: He is not in acute distress.     Appearance: He is not ill-appearing or diaphoretic.   HENT:      Head: Normocephalic and atraumatic.      Nose: Nose normal.   Eyes:      General:         Right eye: No discharge.         Left eye: No discharge.      Extraocular Movements: Extraocular movements intact.      Pupils: Pupils are equal, round, and reactive to light.   Neck:      Thyroid: No thyromegaly.      Vascular: No JVD.   Cardiovascular:      Rate and Rhythm: Normal rate.      Heart sounds: No murmur heard.  Pulmonary:      Effort: No respiratory distress.      Breath sounds: Normal breath sounds. No wheezing.   Abdominal:      General: Bowel sounds are normal. There is no distension.      Palpations: Abdomen is soft.      Tenderness: There is no abdominal tenderness.   Musculoskeletal:         General: No swelling or tenderness.      Cervical back: Neck supple.   Skin:     General: Skin is warm and dry.      Findings: No erythema or rash.   Neurological:      Mental Status: He is alert and oriented to person, place, and time.      Cranial Nerves: No cranial nerve deficit.      Sensory: No sensory deficit.      Motor: No weakness.      Coordination: Coordination normal.   Psychiatric:         Behavior: Behavior normal.         Thought Content: Thought content normal.         Fluids  No intake or output data in the 24 hours ending 07/28/22 1355    Laboratory  Recent Labs     07/26/22 1412 07/27/22 2020   WBC 11.1* 12.1*  12.1*   RBC 5.40 5.55  5.55   HEMOGLOBIN 15.4 16.2  16.2   HEMATOCRIT 46.0 47.0  47.0   MCV 85.2 84.7  84.7   MCH 28.5 29.2  29.2   MCHC 33.5* 34.5  34.5   RDW 38.2 38.0  38.0   PLATELETCT 314 315  315   MPV 9.7 10.0  10.0     Recent Labs     07/26/22 1412 07/27/22 2020   SODIUM 134* 136   POTASSIUM 3.9 4.0   CHLORIDE 97 99   CO2 27 24   GLUCOSE 348* 413*   BUN 8 7*   CREATININE 0.80 0.83   CALCIUM 9.0 9.4     Recent Labs     07/27/22 2107   APTT 28.2    INR 1.03         Recent Labs     07/27/22  2139   TRIGLYCERIDE 515*   HDL 28*   LDL see below       Imaging  MR-BRAIN-W/O   Final Result      1.  Well-circumscribed 24 mm x 17 mm area of acute infarction in the left parietal lobe subcortical and deep white matter. Question of minimal hemorrhagic transformation with a thin curvilinear area of gradient echo hypointensity.   2.  Additional multifocal subcentimeter and punctate areas of acute infarction involving the left anterior frontal lobe, left frontal convexity, and left occipital cortex. There appears to be carotid origin of the posterior cerebral arteries and these    lesions may all be within the territory of the left internal carotid artery. Nonetheless, an embolic mechanism would be considered.   3.  Minimal supratentorial white matter disease elsewhere in the cerebral hemispheres most consistent with microvascular ischemic change.      CT-CTA HEAD WITH & W/O-POST PROCESS   Final Result         1.  No large vessel occlusion or aneurysm identified.   2.  Area of low-density in the left parietal lobe near the vertex, appearance suggests area of prior infarct. Follow-up MRI of the brain with contrast to definitively exclude intracranial lesion due to relative discrete appearance.      CT-CTA NECK WITH & W/O-POST PROCESSING   Final Result         1.  Slight narrowing of the distal left internal carotid artery near the bony carotid canal, could represent underlying soft plaque or vascular spasm.         DX-CHEST-PORTABLE (1 VIEW)   Final Result      Mild left basilar atelectasis      EC-ECHOCARDIOGRAM COMPLETE W/O CONT    (Results Pending)        Assessment/Plan  * CVA (cerebral vascular accident) (HCC)  Assessment & Plan  Admit patient to neurology floor  CT shows Area of low-density in the left parietal lobe near the vertex, appearance suggests area of prior infarct.   Neuro on board, recommends CVA workup  MRI brain wo contrast   Neuro check Q4H   No need for  permissive HTN as symptoms started three weeks ago     7/28 start aspirin and statin  Triglyceride of 515, will initiate fibrate's  MRI pending  CTA of the head with left parietal lobe, but possible prior infarct  CT of the neck left ICA occlusion near the canal plaque versus spasm  Neurology to follow-up  Follow-up echocardiogram  Continue telemetry monitoring    Tobacco abuse  Assessment & Plan  Counseled on benefits of tobacco cessation     Elevated troponin  Assessment & Plan  Troponin 103 -> 105, no further ACS trend.    EKG changes noted. No chest pain.  Cards on board  TTE   Optimize BP and glycemia   Asymptomatic, denies chest pain    Uncontrolled diabetes mellitus with hyperglycemia (HCC)  Assessment & Plan  Noted to have uncontrolled hyperglycemia  Lantus, sliding scale   A1c        VTE prophylaxis: SCDs/TEDs    I have performed a physical exam and reviewed and updated ROS and Plan today (7/28/2022). In review of yesterday's note (7/27/2022), there are no changes except as documented above.

## 2022-07-28 NOTE — H&P
Hospital Medicine History & Physical Note    Date of Service  7/27/2022    Primary Care Physician  Pcp Pt States None    Consultants  Cardiology  Neurology    Code Status  Full Code    Chief Complaint  Chief Complaint   Patient presents with   • Diplopia   • Loss Of Balance   • Headache       History of Presenting Illness  Alireza Valenzuela is a 57 y.o. male who presented 7/27/2022 with blurry vision and headache for the last 4 days.  States that he was driving and noticed that he had difficulty seeing the car in front of him during this duration.  He eventually had to stop driving due to poor visibility of the road.  Describes headache as a fullness sensation in the back of his head.  Denies nausea vomiting chest pain shortness of breath lower extremity weakness.  He saw his ophthalmologist who told him after exams that his vision was 20 out of 20 on both eyes and recommended him to come to the ED to rule out stroke.    Patient smokes a pack of cigarettes a day for the last 40 years.  Does not take any medications.    In the ED, patient found to have elevated blood pressure.  Labs include leukocytosis, sugar 413, alkaline phosphatase 108, troponin 103. CXR shows no acute cardiopulmonary abnormality. CT head shows area of low density in the left parietal lobe near the vertex, appearance suggests area of prior infarct. EKG shows NSR w/ LAD with TWI in lateral leads, and repolarization abnormalities in anterior leads.     I discussed the plan of care with patient.    Review of Systems  Review of Systems   Constitutional: Positive for malaise/fatigue.   HENT: Negative.    Eyes: Positive for double vision.   Respiratory: Negative.    Cardiovascular: Negative.    Gastrointestinal: Negative.    Genitourinary: Negative.    Musculoskeletal: Negative.    Skin: Negative.    Neurological: Negative.    Endo/Heme/Allergies: Negative.    Psychiatric/Behavioral: Negative.        Past Medical History   has a past medical  history of Patient denies medical problems.    Surgical History  No pertinent surgical history    Family History   Family history reviewed with patient. There is no family history that is pertinent to the chief complaint.     Social History   reports that he has been smoking cigarettes. He has been smoking about 1.00 pack per day. He has never used smokeless tobacco. He reports current alcohol use. He reports that he does not use drugs.    Allergies  No Known Allergies    Medications  Prior to Admission Medications   Prescriptions Last Dose Informant Patient Reported? Taking?   Saw Palmetto 500 MG Cap 7/24/2022 at AM Patient Yes Yes   Sig: Take 1 Capsule by mouth every day.   metoprolol SR (TOPROL XL) 25 MG TABLET SR 24 HR 7/27/2022 at AM Patient No No   Sig: Take 1 Tablet by mouth every day.      Facility-Administered Medications: None       Physical Exam  Temp:  [36.4 °C (97.5 °F)] 36.4 °C (97.5 °F)  Pulse:  [] 81  Resp:  [16-17] 17  BP: (142-187)/() 187/106  SpO2:  [91 %-94 %] 93 %  Blood Pressure: (!) 187/106   Temperature: 36.4 °C (97.5 °F)   Pulse: 81   Respiration: 17   Pulse Oximetry: 93 %       Physical Exam  Constitutional:       Appearance: Normal appearance. He is normal weight.   HENT:      Head: Normocephalic.      Nose: Nose normal.      Mouth/Throat:      Mouth: Mucous membranes are moist.   Eyes:      Pupils: Pupils are equal, round, and reactive to light.   Cardiovascular:      Rate and Rhythm: Normal rate and regular rhythm.   Pulmonary:      Effort: Pulmonary effort is normal.      Breath sounds: Normal breath sounds.   Abdominal:      General: Abdomen is flat. Bowel sounds are normal.      Palpations: Abdomen is soft.   Musculoskeletal:         General: Normal range of motion.      Cervical back: Normal range of motion.   Skin:     General: Skin is warm.   Neurological:      General: No focal deficit present.      Mental Status: He is alert and oriented to person, place, and time.  Mental status is at baseline.   Psychiatric:         Mood and Affect: Mood normal.         Thought Content: Thought content normal.         Judgment: Judgment normal.         Laboratory:  Recent Labs     07/26/22 1412 07/27/22 2020   WBC 11.1* 12.1*  12.1*   RBC 5.40 5.55  5.55   HEMOGLOBIN 15.4 16.2  16.2   HEMATOCRIT 46.0 47.0  47.0   MCV 85.2 84.7  84.7   MCH 28.5 29.2  29.2   MCHC 33.5* 34.5  34.5   RDW 38.2 38.0  38.0   PLATELETCT 314 315  315   MPV 9.7 10.0  10.0     Recent Labs     07/26/22 1412 07/27/22 2020   SODIUM 134* 136   POTASSIUM 3.9 4.0   CHLORIDE 97 99   CO2 27 24   GLUCOSE 348* 413*   BUN 8 7*   CREATININE 0.80 0.83   CALCIUM 9.0 9.4     Recent Labs     07/26/22 1412 07/27/22 2020   ALTSGPT 23 21   ASTSGOT 14 14   ALKPHOSPHAT 106* 108*   TBILIRUBIN 0.2 0.2   GLUCOSE 348* 413*     Recent Labs     07/27/22 2107   APTT 28.2   INR 1.03     No results for input(s): NTPROBNP in the last 72 hours.      Recent Labs     07/27/22 2020   TROPONINT 103*       Imaging:  CT-CTA HEAD WITH & W/O-POST PROCESS   Final Result         1.  No large vessel occlusion or aneurysm identified.   2.  Area of low-density in the left parietal lobe near the vertex, appearance suggests area of prior infarct. Follow-up MRI of the brain with contrast to definitively exclude intracranial lesion due to relative discrete appearance.      CT-CTA NECK WITH & W/O-POST PROCESSING   Final Result         1.  Slight narrowing of the distal left internal carotid artery near the bony carotid canal, could represent underlying soft plaque or vascular spasm.         DX-CHEST-PORTABLE (1 VIEW)   Final Result      Mild left basilar atelectasis          EKG:  I have personally reviewed the images and compared with prior images.    Assessment/Plan:  Justification for Admission Status  I anticipate this patient is appropriate for observation status at this time because Patient has a CVA with mild residual deficits.  Possible  discharge tomorrow.        * CVA (cerebral vascular accident) (HCC)  Assessment & Plan  Admit patient to neurology floor  CT shows Area of low-density in the left parietal lobe near the vertex, appearance suggests area of prior infarct.   Neuro on board, recommends CVA workup  MRI brain wo contrast   Neuro check Q4H   No need for permissive HTN as symptoms started three weeks ago       Tobacco abuse  Assessment & Plan  Counseled on benefits of tobacco cessation     Elevated troponin  Assessment & Plan  Troponin 103 -> 105, no further ACS trend.    EKG changes noted. No chest pain.  Cards on board  TTE   Optimize BP and glycemia     Uncontrolled diabetes mellitus with hyperglycemia (HCC)  Assessment & Plan  Noted to have uncontrolled hyperglycemia  Lantus, sliding scale   A1c       VTE prophylaxis: heparin ppx

## 2022-07-28 NOTE — DISCHARGE PLANNING
[facility] Acute Rehabilitation Transitional Care Coordination    Referral from:  Phillip Muhammad  Insurance Provider on Facesheet: Uninsured - please have PFA verify insurance.  Potential Rehab Diagnosis: Stroke    Chart review indicates patient may need on going medical management and may have therapy needs to possibly meet inpatient rehab facility criteria with the goal of returning to community.    D/C support: TBD     Physiatry consultation pended per protocol.     Patient is not a candidate for IPR, per therapy no functional deficits identified. Physiatry consult pended, TCC will no longer follow.     Thank you for the referral.

## 2022-07-28 NOTE — ED PROVIDER NOTES
ED Provider Note    Scribed for Telma Plasencia M.D. by Mo Chung. 7/27/2022, 7:34 PM.    Primary care provider: Pcp Pt States None  Means of arrival: Wheel Chair  History obtained from: Patient  History limited by: None     CHIEF COMPLAINT  Chief Complaint   Patient presents with   • Diplopia   • Loss Of Balance   • Headache       HPI  Alireza Valenzuela is a 57 y.o. male who presents to the Emergency Department for double visio, mild throbbing headache and loss of balance onset 3 days ago.  Patient reports that he was driving 3 days ago when all of a sudden he noticed that he was having double vision and floaters in his eyes.  He reports associated mild throbbing headache.  He also notes that he has been off balance and per friend that is with him he has been running into things due to his balance issues.  He denies recent trauma.  Patient was seen by ophthalmology today and was advised that his eye exam was normal and therefore he was advised to come to ER for further evaluation.  Patient reports associated headache, confusion, lightheadedness. There are no alleviating or exacerbating factors. He denies associated fever, chills, nausea, or vomiting.     Review of records show the patient was seen in the ED yesterday for blurred vision. Hypertension or undiagnosed diabetes were suspected to cause his symptoms and patient was referred to ophthalmology. Patient's labs from yesterday were significant for a blood glucose of 348    REVIEW OF SYSTEMS  Review of Systems   Constitutional: Negative for chills and fever.   Eyes: Positive for double vision.   Respiratory: Positive for cough.    Gastrointestinal: Negative for nausea and vomiting.   Neurological: Positive for headaches.        Positive for confusion, lightheadedness.   All other systems reviewed and are negative.        PAST MEDICAL HISTORY   has a past medical history of Patient denies medical problems.    SURGICAL HISTORY  patient denies any surgical  "history    SOCIAL HISTORY  Social History     Tobacco Use   • Smoking status: Current Every Day Smoker     Packs/day: 1.00     Types: Cigarettes   • Smokeless tobacco: Never Used   Substance Use Topics   • Alcohol use: Yes     Comment: rare   • Drug use: Never      Social History     Substance and Sexual Activity   Drug Use Never       FAMILY HISTORY  History reviewed. No pertinent family history.    CURRENT MEDICATIONS  Home Medications     Reviewed by Sharri Donaldson (Pharmacy Tech) on 07/27/22 at 2209  Med List Status: Complete   Medication Last Dose Status   metoprolol SR (TOPROL XL) 25 MG TABLET SR 24 HR 7/27/2022 Active   Saw Palmetto 500 MG Cap 7/24/2022 Active                ALLERGIES  No Known Allergies    PHYSICAL EXAM  VITAL SIGNS: BP (!) 142/88   Pulse 95   Temp 36.4 °C (97.5 °F) (Temporal)   Resp 17   Ht 1.93 m (6' 4\")   Wt 111 kg (245 lb)   SpO2 91%   BMI 29.82 kg/m²   Vitals reviewed by myself.  Nursing note and vitals reviewed.  Constitutional: Well-developed and well-nourished. No distress.   HENT: Head is normocephalic and atraumatic. Oropharynx is clear and moist without exudate or erythema.   Eyes: Pupils are equal, round, and reactive to light. No horizontal or vertical nystagmus. Conjunctiva are normal.   Cardiovascular: Normal rate and regular rhythm. No murmur heard. Normal radial pulses.  Pulmonary/Chest: Breath sounds normal. No wheezes or rales.   Abdominal: Soft and non-tender. No distention.    Musculoskeletal: Extremities exhibit normal range of motion without edema or tenderness. Patient ambulates with a bit of unsteadiness  Neurological: Awake, alert and oriented to person, place, and time. No focal deficits noted. Cranial nerves II - XII intact. No pronator drift. Normal speech and language. Normal strength and sensation in bilateral upper and lower extremities.Slightly unsteady on feet.   Skin: Skin is warm and dry. No rash.   Psychiatric: Normal mood and affect. " Appropriate for clinical situation.    DIAGNOSTIC STUDIES /  LABS  Labs Reviewed   CBC WITH DIFFERENTIAL - Abnormal; Notable for the following components:       Result Value    WBC 12.1 (*)     All other components within normal limits    Narrative:     Indicate which anticoagulants the patient is on:->UNKNOWN   COMP METABOLIC PANEL - Abnormal; Notable for the following components:    Glucose 413 (*)     Bun 7 (*)     Alkaline Phosphatase 108 (*)     All other components within normal limits    Narrative:     Indicate which anticoagulants the patient is on:->UNKNOWN   TROPONIN - Abnormal; Notable for the following components:    Troponin T 103 (*)     All other components within normal limits    Narrative:     Indicate which anticoagulants the patient is on:->UNKNOWN   CBC WITH DIFFERENTIAL - Abnormal; Notable for the following components:    WBC 12.1 (*)     All other components within normal limits    Narrative:     Indicate which anticoagulants the patient is on:->UNKNOWN   TROPONIN - Abnormal; Notable for the following components:    Troponin T 105 (*)     All other components within normal limits   POCT GLUCOSE DEVICE RESULTS - Abnormal; Notable for the following components:    POC Glucose, Blood 389 (*)     All other components within normal limits   COD (ADULT)   PROTHROMBIN TIME    Narrative:     Recollect: Quantity not sufficient for testing. Notified: LIZZETH Lynn   APTT    Narrative:     Recollect: Quantity not sufficient for testing. Notified: LIZZETH Lynn   ESTIMATED GFR    Narrative:     Indicate which anticoagulants the patient is on:->UNKNOWN   HEMOGLOBIN A1C   ABO RH CONFIRM   LIPID PROFILE       All labs reviewed by me.    EKG Interpretation:  Interpreted by myself    12 Lead EKG interpreted by me to show:  EKG at 8:09 PM: Normal sinus rhythm, heart rate 89, left axis deviation, normal intervals, , QRS 98, QTc 476, nonspecific T wave inversions in anterolateral leads, no acute ST  elevation, no evidence of acute arrhythmia or ischemia  My impression of this EKG: Does not indicate ischemia or arrhythmia at this time.    RADIOLOGY  CT-CTA HEAD WITH & W/O-POST PROCESS   Final Result         1.  No large vessel occlusion or aneurysm identified.   2.  Area of low-density in the left parietal lobe near the vertex, appearance suggests area of prior infarct. Follow-up MRI of the brain with contrast to definitively exclude intracranial lesion due to relative discrete appearance.      CT-CTA NECK WITH & W/O-POST PROCESSING   Final Result         1.  Slight narrowing of the distal left internal carotid artery near the bony carotid canal, could represent underlying soft plaque or vascular spasm.         DX-CHEST-PORTABLE (1 VIEW)   Final Result      Mild left basilar atelectasis        The radiologist's interpretation of all radiological studies have been reviewed by me.    REASSESSMENT  7:34 PM - Patient evaluated at bedside. Discussed plan of care with patient. I informed them that labs and imaging will be ordered to evaluate symptoms. Patient is understanding and agreeable with plan.      COURSE & MEDICAL DECISION MAKING  Nursing notes, VS, PMSFHx reviewed in chart.    Patient is a 57-year-old male who comes in for evaluation of diplopia headache and difficulty with balance.  Differential diagnosis includes CVA, TIA, hypertensive urgency, uncontrolled diabetes.  Patient symptoms onset was 3 days ago and therefore he has not activated as a stroke protocol.  However I am concerned about stroke and therefore have ordered CT imaging of the head and neck along with labs for further work-up.    Patient's initial vitals notable for hypertension, will allow for hypertension at this time given concern for possible stroke.  EKG returns and demonstrates nonspecific T wave inversions in anterior lateral leads.  Labs returned are notable for an elevated troponin.  At this time patient is not having any chest pain  and still suspect that likely etiology of symptomatology is stroke.  However given the T wave inversions and elevated troponin I did discuss the case with cardiologist Dr. Hua who recommends treating underlying neurologic issues and trending troponins.  CT imaging returns and is notable for slight narrowing of the distal left internal carotid artery.  Patient also noted to have low-density area in the parietal lobe which likely represents infarct given his current symptomatology.  Discussed the case with neurology Dr. Redd as patient is hypertensive with elevated troponin we discussed blood pressure goals and since he is 3 days out from his symptoms currently okay to be normotensive.  He recommends MRI and stroke work-up, neurology team will consult.  Patient is given a dose of IV labetalol after which blood pressure does improve.  He is also noted to be hyperglycemic and has undiagnosed diabetes.  Patient will be hospitalized for ongoing management.  Discussed the case with Dr. Romo who has accepted patient for hospitalization.  Patient is in guarded condition.      FINAL IMPRESSION  1. Acute CVA (cerebrovascular accident) (HCC)    2. Cerebrovascular accident (CVA), unspecified mechanism (HCC)    3. Diplopia    4. Nonintractable headache, unspecified chronicity pattern, unspecified headache type    5. Balance problem          Mo MAR (Johnnieibjohana), am scribing for, and in the presence of, Telma Plasencia M.D..    Electronically signed by: Mo Chung (Kenzie), 7/27/2022    Telma MAR M.D. personally performed the services described in this documentation, as scribed by Mo Chung in my presence, and it is both accurate and complete.    The note accurately reflects work and decisions made by me.  Telma Plasencia M.D.  7/27/2022  11:08 PM

## 2022-07-28 NOTE — PROGRESS NOTES
"Cardiology Progress Note    I was called regarding patient presenting with confusion and double vision and elevated BP. Chart review showing that he presented earlier today with \"blurred vision\" but no double vision this earlier appt. His BP was markedly elevated during this earlier presentation as well. In any case this second time he came into ED, HS troponin was checked showing mild elevation. He presents with no definite cardiac symptoms. EKG showing anterior ST fullness and TWI across precordium but no ST elevation meeting criteria for acute MI. Labs with marked hyperglycemia and CT head showing possible non-acute stroke.     In the absence of any cardiac symptoms, I advise pursue neurologic w/u and gradual BP reduction and correction of metabolic disturbances including marked hyperglycemia. If trop trends up in pattern consistent with acute injury than I think cardiology involvement reasonable. If trop stays relatively flat down trending, pattern more consistent with demand in setting of hypertensive crisis/metabolic disturbance/neurologic event in setting of probably undiagnosed but stable coronary disease and diminished coronary reserve at baseline. Echo/nuc can be used risk stratify in that setting and if concerning cardiology involvement appropriate. Recommendations discussed with Dr. Plasencia.    José Luising Melita   "

## 2022-07-28 NOTE — ED NOTES
Lab called with critical result of Trop 103 at 2117. Critical lab result read back to lab.   Dr. Plasencia notified of critical lab result at 2118.  Critical lab result read back by Dr. Plasencia.

## 2022-07-28 NOTE — ED TRIAGE NOTES
Alireza Elcassidy Valenzuela  57 y.o. male  Chief Complaint   Patient presents with   • Diplopia   • Loss Of Balance   • Headache     Pt WC to triage for above complaints. States sx have bene ongoing for 3 days. Was seen in ED yesterday, referred to eye clinic. Sent back to ED from eye clinic today.     Pt A&Ox4, GCS 15, no acute distress. States the double vision is bothering him the most. No facial droop, no speech changes, no unilateral weakness.    States was just dx with HTN yesterday, prescribed metoprolol, has taken 2 doses.     Triage process explained to patient, returned to karla. Pt encouraged to notify staff of any change in condition.

## 2022-07-28 NOTE — THERAPY
Occupational Therapy   Initial Evaluation     Patient Name: Alireza Valenzuela  Age:  57 y.o., Sex:  male  Medical Record #: [id number]  Today's Date: 7/28/2022          Assessment  Patient is a 57 y.o. male who presented to the hospital with blurry vision and headache. MRI positive for area of acute infarction in the left parietal lobe as well as multifocal subcentimeter and punctate areas of acute infarction in the left anterior frontal lobe, left frontal convexity and left occipital cortex. Pt is fully independent at baseline. He plans to go stay with his daughter in [place] after discharge from the hospital. During OT eval, no functional deficits were identified. Pt presents with intact strength, coordination, vision and balance needed for basic ADLs. No further OT needs.      Plan    Recommend Occupational Therapy for Evaluation only     DC Equipment Recommendations: None  Discharge Recommendations: Anticipate that the patient will have no further occupational therapy needs after discharge from the hospital     Subjective    Agreeable to therapy session      Objective       07/28/22 1006   Prior Living Situation   Prior Services Home-Independent;None   Housing / Facility [facility]   Bathroom Set up Bathtub / Shower Combination   Comments Pt typically lives alone but plans to stay with his daugher in [place] for awhile   Prior Level of ADL Function   Self Feeding Independent   Grooming / Hygiene Independent   Bathing Independent   Dressing Independent   Toileting Independent   Prior Level of IADL Function   Medication Management Independent   Laundry Independent   Kitchen Mobility Independent   Finances Independent   Home Management Independent   Shopping Independent   Prior Level Of Mobility Independent Without Device in Community   Driving / Transportation Driving Independent   Occupation (Pre-Hospital Vocational)   (was working but plans to retire now)   Vitals   O2 Delivery Device None - Room Air    Pain 0 - 10 Group   Therapist Pain Assessment During Activity;Nurse Notified;0   Cognition    Cognition / Consciousness WDL   Level of Consciousness Alert   Active ROM Upper Body   Active ROM Upper Body  WDL   Dominant Hand Right   Strength Upper Body   Upper Body Strength  WDL   Sensation Upper Body   Upper Extremity Sensation  WDL   Upper Body Muscle Tone   Upper Body Muscle Tone  WDL   Neurological Concerns   Neurological Concerns No   Coordination Upper Body   Coordination WDL   Balance Assessment   Sitting Balance (Static) Normal   Sitting Balance (Dynamic) Normal   Standing Balance (Static) Normal   Standing Balance (Dynamic) Normal   Weight Shift Sitting Normal   Weight Shift Standing Normal   Comments no AD   ADL Assessment   Grooming Standing;Independent  (wash hands at sink)   Upper Body Dressing Independent  (gown)   Lower Body Dressing Independent  (don/doff shoes)   Toileting   (denied need but did demo)   6 Clicks Daily Activity Score 24   mRS Prior to admission   Prior to admission mRS 0   Modified [place] (mRS)   Modified [place] Score 0   Functional Mobility   Toilet Transfers Independent  (no grab bar)   Mobility up walking without AD, no LOB noted   Visual Perception   Visual Perception  WDL   Comments pt denies any current vision deficits. Reports that all deficits have resolved   Activity Tolerance   Comments activity tolerance is functional for ADLs   Education Group   Role of Occupational Therapist Patient Response Patient;Acceptance;Explanation;Verbal Demonstration   Problem List   Problem List None

## 2022-07-28 NOTE — ED NOTES
Pt to room via wc, changed into gown, chart up for ERP. Pt stating that he has been having memory issues prior to double vision and has been under a lot of stress. Pt educated on ER process, pt verbalized understanding. Wife at bedside.

## 2022-07-29 ENCOUNTER — APPOINTMENT (OUTPATIENT)
Dept: RADIOLOGY | Facility: MEDICAL CENTER | Age: 57
DRG: 065 | End: 2022-07-29
Attending: INTERNAL MEDICINE
Payer: COMMERCIAL

## 2022-07-29 PROBLEM — R56.9 SEIZURE (HCC): Status: ACTIVE | Noted: 2022-07-29

## 2022-07-29 PROBLEM — E78.1 HYPERTRIGLYCERIDEMIA: Status: ACTIVE | Noted: 2022-07-29

## 2022-07-29 LAB
ABO + RH BLD: NORMAL
ANION GAP SERPL CALC-SCNC: 11 MMOL/L (ref 7–16)
BASOPHILS # BLD AUTO: 0.7 % (ref 0–1.8)
BASOPHILS # BLD: 0.09 K/UL (ref 0–0.12)
BUN SERPL-MCNC: 12 MG/DL (ref 8–22)
CALCIUM SERPL-MCNC: 9.1 MG/DL (ref 8.5–10.5)
CHLORIDE SERPL-SCNC: 100 MMOL/L (ref 96–112)
CO2 SERPL-SCNC: 25 MMOL/L (ref 20–33)
CREAT SERPL-MCNC: 0.68 MG/DL (ref 0.5–1.4)
EOSINOPHIL # BLD AUTO: 0.45 K/UL (ref 0–0.51)
EOSINOPHIL NFR BLD: 3.5 % (ref 0–6.9)
ERYTHROCYTE [DISTWIDTH] IN BLOOD BY AUTOMATED COUNT: 39.1 FL (ref 35.9–50)
EST. AVERAGE GLUCOSE BLD GHB EST-MCNC: 309 MG/DL
GFR SERPLBLD CREATININE-BSD FMLA CKD-EPI: 108 ML/MIN/1.73 M 2
GLUCOSE BLD STRIP.AUTO-MCNC: 161 MG/DL (ref 65–99)
GLUCOSE BLD STRIP.AUTO-MCNC: 200 MG/DL (ref 65–99)
GLUCOSE BLD STRIP.AUTO-MCNC: 267 MG/DL (ref 65–99)
GLUCOSE BLD STRIP.AUTO-MCNC: 270 MG/DL (ref 65–99)
GLUCOSE SERPL-MCNC: 186 MG/DL (ref 65–99)
HBA1C MFR BLD: 12.4 % (ref 4–5.6)
HCT VFR BLD AUTO: 46.8 % (ref 42–52)
HGB BLD-MCNC: 15.8 G/DL (ref 14–18)
IMM GRANULOCYTES # BLD AUTO: 0.06 K/UL (ref 0–0.11)
IMM GRANULOCYTES NFR BLD AUTO: 0.5 % (ref 0–0.9)
LV EJECT FRACT  99904: 65
LV EJECT FRACT MOD 2C 99903: 73.45
LV EJECT FRACT MOD 4C 99902: 68.13
LV EJECT FRACT MOD BP 99901: 70.14
LYMPHOCYTES # BLD AUTO: 4.17 K/UL (ref 1–4.8)
LYMPHOCYTES NFR BLD: 32.7 % (ref 22–41)
MCH RBC QN AUTO: 29.1 PG (ref 27–33)
MCHC RBC AUTO-ENTMCNC: 33.8 G/DL (ref 33.7–35.3)
MCV RBC AUTO: 86.2 FL (ref 81.4–97.8)
MONOCYTES # BLD AUTO: 0.75 K/UL (ref 0–0.85)
MONOCYTES NFR BLD AUTO: 5.9 % (ref 0–13.4)
NEUTROPHILS # BLD AUTO: 7.24 K/UL (ref 1.82–7.42)
NEUTROPHILS NFR BLD: 56.7 % (ref 44–72)
NRBC # BLD AUTO: 0 K/UL
NRBC BLD-RTO: 0 /100 WBC
PLATELET # BLD AUTO: 326 K/UL (ref 164–446)
PMV BLD AUTO: 9.8 FL (ref 9–12.9)
POTASSIUM SERPL-SCNC: 3.4 MMOL/L (ref 3.6–5.5)
RBC # BLD AUTO: 5.43 M/UL (ref 4.7–6.1)
SODIUM SERPL-SCNC: 136 MMOL/L (ref 135–145)
WBC # BLD AUTO: 12.8 K/UL (ref 4.8–10.8)

## 2022-07-29 PROCEDURE — 95819 EEG AWAKE AND ASLEEP: CPT | Performed by: STUDENT IN AN ORGANIZED HEALTH CARE EDUCATION/TRAINING PROGRAM

## 2022-07-29 PROCEDURE — A9270 NON-COVERED ITEM OR SERVICE: HCPCS | Performed by: STUDENT IN AN ORGANIZED HEALTH CARE EDUCATION/TRAINING PROGRAM

## 2022-07-29 PROCEDURE — 770020 HCHG ROOM/CARE - TELE (206)

## 2022-07-29 PROCEDURE — A9270 NON-COVERED ITEM OR SERVICE: HCPCS | Performed by: INTERNAL MEDICINE

## 2022-07-29 PROCEDURE — 700102 HCHG RX REV CODE 250 W/ 637 OVERRIDE(OP): Performed by: NURSE PRACTITIONER

## 2022-07-29 PROCEDURE — 700111 HCHG RX REV CODE 636 W/ 250 OVERRIDE (IP): Performed by: INTERNAL MEDICINE

## 2022-07-29 PROCEDURE — 700111 HCHG RX REV CODE 636 W/ 250 OVERRIDE (IP): Performed by: STUDENT IN AN ORGANIZED HEALTH CARE EDUCATION/TRAINING PROGRAM

## 2022-07-29 PROCEDURE — 36415 COLL VENOUS BLD VENIPUNCTURE: CPT

## 2022-07-29 PROCEDURE — 93306 TTE W/DOPPLER COMPLETE: CPT | Mod: 26 | Performed by: INTERNAL MEDICINE

## 2022-07-29 PROCEDURE — A9270 NON-COVERED ITEM OR SERVICE: HCPCS | Performed by: NURSE PRACTITIONER

## 2022-07-29 PROCEDURE — 700102 HCHG RX REV CODE 250 W/ 637 OVERRIDE(OP): Performed by: INTERNAL MEDICINE

## 2022-07-29 PROCEDURE — 700111 HCHG RX REV CODE 636 W/ 250 OVERRIDE (IP): Performed by: NURSE PRACTITIONER

## 2022-07-29 PROCEDURE — 99233 SBSQ HOSP IP/OBS HIGH 50: CPT | Mod: 25 | Performed by: NURSE PRACTITIONER

## 2022-07-29 PROCEDURE — 99233 SBSQ HOSP IP/OBS HIGH 50: CPT | Performed by: INTERNAL MEDICINE

## 2022-07-29 PROCEDURE — 85025 COMPLETE CBC W/AUTO DIFF WBC: CPT

## 2022-07-29 PROCEDURE — 83036 HEMOGLOBIN GLYCOSYLATED A1C: CPT

## 2022-07-29 PROCEDURE — 700111 HCHG RX REV CODE 636 W/ 250 OVERRIDE (IP)

## 2022-07-29 PROCEDURE — 80048 BASIC METABOLIC PNL TOTAL CA: CPT

## 2022-07-29 PROCEDURE — 95819 EEG AWAKE AND ASLEEP: CPT | Mod: 26 | Performed by: STUDENT IN AN ORGANIZED HEALTH CARE EDUCATION/TRAINING PROGRAM

## 2022-07-29 PROCEDURE — 4A10X4Z MONITORING OF CENTRAL NERVOUS ELECTRICAL ACTIVITY, EXTERNAL APPROACH: ICD-10-PCS | Performed by: STUDENT IN AN ORGANIZED HEALTH CARE EDUCATION/TRAINING PROGRAM

## 2022-07-29 PROCEDURE — 70450 CT HEAD/BRAIN W/O DYE: CPT

## 2022-07-29 PROCEDURE — 82962 GLUCOSE BLOOD TEST: CPT | Mod: 91

## 2022-07-29 PROCEDURE — 700102 HCHG RX REV CODE 250 W/ 637 OVERRIDE(OP): Performed by: STUDENT IN AN ORGANIZED HEALTH CARE EDUCATION/TRAINING PROGRAM

## 2022-07-29 RX ORDER — LEVETIRACETAM 500 MG/5ML
30 INJECTION, SOLUTION, CONCENTRATE INTRAVENOUS ONCE
Status: COMPLETED | OUTPATIENT
Start: 2022-07-29 | End: 2022-07-29

## 2022-07-29 RX ORDER — ENOXAPARIN SODIUM 100 MG/ML
40 INJECTION SUBCUTANEOUS DAILY
Status: DISCONTINUED | OUTPATIENT
Start: 2022-07-29 | End: 2022-07-30 | Stop reason: HOSPADM

## 2022-07-29 RX ORDER — LORAZEPAM 2 MG/ML
2 INJECTION INTRAMUSCULAR EVERY 4 HOURS PRN
Status: DISCONTINUED | OUTPATIENT
Start: 2022-07-29 | End: 2022-07-30

## 2022-07-29 RX ORDER — LORAZEPAM 2 MG/ML
4 INJECTION INTRAMUSCULAR
Status: DISCONTINUED | OUTPATIENT
Start: 2022-07-29 | End: 2022-07-30 | Stop reason: HOSPADM

## 2022-07-29 RX ORDER — LORAZEPAM 2 MG/ML
INJECTION INTRAMUSCULAR
Status: COMPLETED
Start: 2022-07-29 | End: 2022-07-29

## 2022-07-29 RX ORDER — LEVETIRACETAM 500 MG/5ML
1000 INJECTION, SOLUTION, CONCENTRATE INTRAVENOUS EVERY 12 HOURS
Status: DISCONTINUED | OUTPATIENT
Start: 2022-07-29 | End: 2022-07-30

## 2022-07-29 RX ADMIN — ATORVASTATIN CALCIUM 80 MG: 80 TABLET, FILM COATED ORAL at 17:22

## 2022-07-29 RX ADMIN — HEPARIN SODIUM 5000 UNITS: 5000 INJECTION, SOLUTION INTRAVENOUS; SUBCUTANEOUS at 14:00

## 2022-07-29 RX ADMIN — LORAZEPAM 2 MG: 2 INJECTION INTRAMUSCULAR at 07:56

## 2022-07-29 RX ADMIN — INSULIN HUMAN 3 UNITS: 100 INJECTION, SOLUTION PARENTERAL at 12:48

## 2022-07-29 RX ADMIN — HEPARIN SODIUM 5000 UNITS: 5000 INJECTION, SOLUTION INTRAVENOUS; SUBCUTANEOUS at 05:56

## 2022-07-29 RX ADMIN — ENOXAPARIN SODIUM 40 MG: 40 INJECTION SUBCUTANEOUS at 22:41

## 2022-07-29 RX ADMIN — INSULIN HUMAN 1 UNITS: 100 INJECTION, SOLUTION PARENTERAL at 20:15

## 2022-07-29 RX ADMIN — INSULIN HUMAN 1 UNITS: 100 INJECTION, SOLUTION PARENTERAL at 17:22

## 2022-07-29 RX ADMIN — INSULIN HUMAN 3 UNITS: 100 INJECTION, SOLUTION PARENTERAL at 07:43

## 2022-07-29 RX ADMIN — FENOFIBRATE 67 MG: 67 CAPSULE ORAL at 05:56

## 2022-07-29 RX ADMIN — LEVETIRACETAM 3330 MG: 100 INJECTION, SOLUTION INTRAVENOUS at 09:04

## 2022-07-29 RX ADMIN — CLOPIDOGREL BISULFATE 75 MG: 75 TABLET ORAL at 05:56

## 2022-07-29 RX ADMIN — LEVETIRACETAM 1000 MG: 100 INJECTION, SOLUTION INTRAVENOUS at 20:15

## 2022-07-29 RX ADMIN — ASPIRIN 81 MG: 81 TABLET, CHEWABLE ORAL at 05:56

## 2022-07-29 RX ADMIN — LISINOPRIL 10 MG: 10 TABLET ORAL at 05:56

## 2022-07-29 RX ADMIN — LORAZEPAM 2 MG: 2 INJECTION INTRAMUSCULAR; INTRAVENOUS at 07:56

## 2022-07-29 ASSESSMENT — ENCOUNTER SYMPTOMS
BACK PAIN: 0
SEIZURES: 1
FOCAL WEAKNESS: 0
NERVOUS/ANXIOUS: 0
PHOTOPHOBIA: 0
VOMITING: 0
BLURRED VISION: 1
DIAPHORESIS: 0
COUGH: 0
SPEECH CHANGE: 0
FEVER: 0
DOUBLE VISION: 1
CHILLS: 0
SHORTNESS OF BREATH: 0
WEAKNESS: 0
MYALGIAS: 0
TREMORS: 0
EYE PAIN: 0
NAUSEA: 1
MEMORY LOSS: 0
ABDOMINAL PAIN: 0
FLANK PAIN: 0
HEADACHES: 0
LOSS OF CONSCIOUSNESS: 0
SENSORY CHANGE: 0
DEPRESSION: 0
DIZZINESS: 1
PALPITATIONS: 0

## 2022-07-29 ASSESSMENT — LIFESTYLE VARIABLES
HAVE YOU EVER FELT YOU SHOULD CUT DOWN ON YOUR DRINKING: NO
TOTAL SCORE: 0
ALCOHOL_USE: YES
ON A TYPICAL DAY WHEN YOU DRINK ALCOHOL HOW MANY DRINKS DO YOU HAVE: 2
TOTAL SCORE: 0
TOTAL SCORE: 0
DOES PATIENT WANT TO STOP DRINKING: NO
AVERAGE NUMBER OF DAYS PER WEEK YOU HAVE A DRINK CONTAINING ALCOHOL: 0
HOW MANY TIMES IN THE PAST YEAR HAVE YOU HAD 5 OR MORE DRINKS IN A DAY: 0
HAVE PEOPLE ANNOYED YOU BY CRITICIZING YOUR DRINKING: NO
EVER FELT BAD OR GUILTY ABOUT YOUR DRINKING: NO
CONSUMPTION TOTAL: NEGATIVE
EVER HAD A DRINK FIRST THING IN THE MORNING TO STEADY YOUR NERVES TO GET RID OF A HANGOVER: NO

## 2022-07-29 ASSESSMENT — PAIN DESCRIPTION - PAIN TYPE: TYPE: ACUTE PAIN;CHRONIC PAIN

## 2022-07-29 NOTE — PROGRESS NOTES
Neurology Progress Note  [facility]    Referring Physician: Xin Issa M.D.    Chief Complaint   Patient presents with   • Diplopia   • Loss Of Balance   • Headache       HPI: Refer to initial documented Neurology H&P, as detailed in the patient's chart.    Interval History 7/29/2022: Notified by Dr. Issa- RN witnessed seizure activity with semiology of right gaze deviation, right sided spasticity evolving into convulsions and then generalizing to full body tonic-clonic seizure lasting 1-2 minutes total with associated tachycardia HR 150s, hypoxia SpO2 80% this morning at approximately 08:00. The patient returned to his baseline within 5 minutes.  Approximately 10 minutes later, the patient had a second seizure of the same semiology lasting 1 minute and was given Ativan 1mg IV and taken to CT. STAT CT head wo contrast ordered and completed.     I assessed the patient upon his return from CT with neurological exam has returned to his baseline aside from sedation s/p Ativan, fully oriented, following commands, and with right homonymous quadrantanopia. Denies headache, tongue biting, incontinence, unilateral facial or extremity weakness, speech/language changes, numbness, tingling, falls, or confusion.     Past Medical History:   Past Medical History:   Diagnosis Date   • Patient denies medical problems         FHx:  Family History   Problem Relation Age of Onset   • Diabetes Mother    • Heart Attack Father 50   • Thyroid Sister    • Thyroid Sister    • No Known Problems Brother    • Stroke Maternal Grandmother         Embolic        SHx:  Social History     Socioeconomic History   • Marital status: Single     Spouse name: Not on file   • Number of children: Not on file   • Years of education: Not on file   • Highest education level: Not on file   Occupational History   • Occupation:    Tobacco Use   • Smoking status: Current Every Day Smoker     Packs/day: 1.00     Years: 40.00      Pack years: 40.00     Types: Cigarettes   • Smokeless tobacco: Former User     Types: Chew   • Tobacco comment: Chewed during  service when he couldn't smoke   Substance and Sexual Activity   • Alcohol use: Yes     Alcohol/week: 1.2 oz     Types: 2 Cans of beer per week     Comment: Works 6 days a week, has a beer or two on days off   • Drug use: Never   • Sexual activity: Not on file   Other Topics Concern   • Not on file   Social History Narrative   • Not on file     Social Determinants of Health     Financial Resource Strain: Not on file   Food Insecurity: Not on file   Transportation Needs: Not on file   Physical Activity: Not on file   Stress: Not on file   Social Connections: Not on file   Intimate Partner Violence: Not on file   Housing Stability: Not on file        Medications:    Current Facility-Administered Medications:   •  LORazepam (ATIVAN) injection 2 mg, 2 mg, Intravenous, Q4HRS PRN, Xin Issa M.D., 2 mg at 07/29/22 0756  •  LORazepam (ATIVAN) injection 4 mg, 4 mg, Intravenous, Q10 MIN PRN, Xin Issa M.D.  •  levETIRAcetam (Keppra) injection 3,330 mg, 30 mg/kg, Intravenous, Once, Jalen Castro, A.P.R.N.  •  levETIRAcetam (Keppra) injection 1,000 mg, 1,000 mg, Intravenous, Q12HRS, Jalen Castro, A.P.R.N.  •  aspirin (ASA) chewable tab 81 mg, 81 mg, Oral, DAILY, Meliza Romo D.O., 81 mg at 07/29/22 0556  •  atorvastatin (LIPITOR) tablet 80 mg, 80 mg, Oral, Q EVENING, SAI Mcallister.O., 80 mg at 07/28/22 1738  •  fenofibrate micronized (LOFIBRA) capsule 67 mg, 67 mg, Oral, DAILY, STEVEN McallisterO., 67 mg at 07/29/22 0556  •  clopidogrel (PLAVIX) tablet 75 mg, 75 mg, Oral, DAILY, Jalen Castro, A.P.R.N., 75 mg at 07/29/22 0556  •  heparin injection 5,000 Units, 5,000 Units, Subcutaneous, Q8HRS, Deondre Romo M.D., 5,000 Units at 07/29/22 0556  •  acetaminophen (Tylenol) tablet 650 mg, 650 mg, Oral, Q6HRS PRN, Deondre Romo M.D.  •  labetalol  (NORMODYNE/TRANDATE) injection 10 mg, 10 mg, Intravenous, Q4HRS PRN, Deondre Romo M.D.  •  insulin regular (HumuLIN R,NovoLIN R) injection, 1-6 Units, Subcutaneous, 4X/DAY ACHS, 3 Units at 07/29/22 0743 **AND** POC blood glucose manual result, , , Q AC AND BEDTIME(S) **AND** NOTIFY MD and PharmD, , , Once **AND** Administer 20 grams of glucose (approximately 8 ounces of fruit juice) every 15 minutes PRN FSBG less than 70 mg/dL, , , PRN **AND** dextrose 50% (D50W) injection 25 g, 25 g, Intravenous, Q15 MIN PRN, Deondre Romo M.D.  •  lisinopril (PRINIVIL) tablet 10 mg, 10 mg, Oral, Q DAY, Deondre Romo M.D., 10 mg at 07/29/22 0556    Allergies:  No Known Allergies     Review of systems: In addition to what is detailed in the interval history above, all other systems reviewed and are negative.      Physical Examination:   Vitals:    07/28/22 2000 07/29/22 0033 07/29/22 0454 07/29/22 0705   BP: (!) 145/85 117/72 139/82 138/79   Pulse: 88 73 81 77   Resp: 16 16 16 18   Temp:    37.2 °C (99 °F)   TempSrc: Temporal Temporal Temporal Temporal   SpO2: 90% 92%  89%   Weight:       Height:         General: Patient in no acute distress, pleasant and cooperative.  HEENT: Normocephalic, no signs of acute trauma.   Neck: Supple, no meningeal signs or carotid bruits. There is normal range of motion. No tenderness on exam.   Chest: Regular and unlabored breaths on 5L oxymask. Intermittent hacking cough.   CV: RRR.   Skin: No signs of acute rashes or trauma.   Musculoskeletal: Joints exhibit full range of motion, without any pain to palpation. There are no signs of joint or muscle swelling. There is no tenderness to deep palpation of muscles.   Psychiatric: No hallucinatory behavior. Denies symptoms of depression or suicidal ideation. Mood and affect appear normal on exam.      NEUROLOGICAL EXAM:  Mental status, orientation: Sedated s/p Ativan, awakens to verbal stimuli, following commands, and fully oriented.    Speech and language: Speech is clear and fluent. The patient is able to name, repeat, and comprehend.   Memory: There is intact recollection of recent and remote events.   Cranial nerve exam: Pupils are 3-4 mm bilaterally and equally reactive to light. Right lower homonymous quadrantanopsia. There is no nystagmus on primary or secondary gaze. Intact full EOM in all directions of gaze. Face appears symmetric. Sensation in the face is intact to light touch. Uvula is midline. Palate elevates symmetrically. Tongue is midline and without any signs of tongue biting or fasciculations. Sternocleidomastoid muscles exhibit is normal strength bilaterally. Shoulder shrug is intact bilaterally.   Motor exam: Strength is 5/5 in all extremities. Chronic right foot drop from prior traumatic injury. Tone is normal. No abnormal movements were seen on exam.   Sensory exam Reveals normal sense of light touch and pinprick in all extremities.   Deep tendon reflexes:  Plantar responses are flexor. There is no clonus.   Coordination: No ataxia with normal finger-nose-finger and heel-down-shin tests. Normal rapidly alternating movements.   Gait: Deferred per patient preference.     NIH Stroke Scale  7/29/22    1a. Level of Consciousness (Alert, drowsy, etc): 1= Drowsy    1b. LOC Questions (Month, age): 0= Answers both correctly    1c. LOC Commands (Open/close eyes make fist/let go): 0= Obeys both correctly    2.   Best Gaze (Eyes open - patient follows examiner's finger on face): 0= Normal    3.   Visual Fields (introduce visual stimulus/threat to patient's field quadrants): 1= Partial Hemiania    4.   Facial Paresis (Show teeth, raise eyebrows and squeeze eyes shut): 0= Normal     5a. Motor Arm - Left (Elevate arm to 90 degrees if patient is sitting, 45 degrees if  supine): 0= No drift    5b. Motor Arm - Right (Elevate arm to 90 degrees if patient is sitting, 45 degrees if supine): 0= No drift    6a. Motor Leg - Left (Elevate leg 30  degrees with patient supine): 0= No drift    6b. Motor Leg - Right  (Elevate leg 30 degrees with patient supine): 0= No drift    7.   Limb Ataxia (Finger-nose, heel down shin): 0= No ataxia    8.   Sensory (Pin prick to face, arm, trunk and leg - compare side to side): 0= Normal    9.  Best Language (Name item, describe a picture and read sentences): 0= No aphasia    10. Dysarthria (Evaluate speech clarity by patient repeating listed words): 0= Normal articulation    11. Extinction and Inattention (Use information from prior testing to identify neglect or  double simultaneous stimuli testing): 0= No neglect    Total NIH Score: 2        Ancillary Data Reviewed:    Labs:  Lab Results   Component Value Date/Time    PROTHROMBTM 13.4 07/27/2022 09:07 PM    INR 1.03 07/27/2022 09:07 PM      Lab Results   Component Value Date/Time    WBC 12.8 (H) 07/29/2022 02:43 AM    RBC 5.43 07/29/2022 02:43 AM    HEMOGLOBIN 15.8 07/29/2022 02:43 AM    HEMATOCRIT 46.8 07/29/2022 02:43 AM    MCV 86.2 07/29/2022 02:43 AM    MCH 29.1 07/29/2022 02:43 AM    MCHC 33.8 07/29/2022 02:43 AM    MPV 9.8 07/29/2022 02:43 AM    NEUTSPOLYS 56.70 07/29/2022 02:43 AM    LYMPHOCYTES 32.70 07/29/2022 02:43 AM    MONOCYTES 5.90 07/29/2022 02:43 AM    EOSINOPHILS 3.50 07/29/2022 02:43 AM    BASOPHILS 0.70 07/29/2022 02:43 AM      Lab Results   Component Value Date/Time    SODIUM 136 07/29/2022 02:43 AM    POTASSIUM 3.4 (L) 07/29/2022 02:43 AM    CHLORIDE 100 07/29/2022 02:43 AM    CO2 25 07/29/2022 02:43 AM    GLUCOSE 186 (H) 07/29/2022 02:43 AM    BUN 12 07/29/2022 02:43 AM    CREATININE 0.68 07/29/2022 02:43 AM      Lab Results   Component Value Date/Time    CHOLSTRLTOT 219 (H) 07/27/2022 09:39 PM    LDL see below 07/27/2022 09:39 PM    HDL 28 (A) 07/27/2022 09:39 PM    TRIGLYCERIDE 515 (H) 07/27/2022 09:39 PM       Lab Results   Component Value Date/Time    ALKPHOSPHAT 108 (H) 07/27/2022 08:20 PM    ASTSGOT 14 07/27/2022 08:20 PM    ALTSGPT 21  07/27/2022 08:20 PM    TBILIRUBIN 0.2 07/27/2022 08:20 PM        Imaging/Testing:    I interpreted and/or reviewed the patient's neuroimaging    EC-ECHOCARDIOGRAM COMPLETE W/O CONT         MR-BRAIN-W/O   Final Result      1.  Well-circumscribed 24 mm x 17 mm area of acute infarction in the left parietal lobe subcortical and deep white matter. Question of minimal hemorrhagic transformation with a thin curvilinear area of gradient echo hypointensity.   2.  Additional multifocal subcentimeter and punctate areas of acute infarction involving the left anterior frontal lobe, left frontal convexity, and left occipital cortex. There appears to be carotid origin of the posterior cerebral arteries and these    lesions may all be within the territory of the left internal carotid artery. Nonetheless, an embolic mechanism would be considered.   3.  Minimal supratentorial white matter disease elsewhere in the cerebral hemispheres most consistent with microvascular ischemic change.      CT-CTA HEAD WITH & W/O-POST PROCESS   Final Result         1.  No large vessel occlusion or aneurysm identified.   2.  Area of low-density in the left parietal lobe near the vertex, appearance suggests area of prior infarct. Follow-up MRI of the brain with contrast to definitively exclude intracranial lesion due to relative discrete appearance.      CT-CTA NECK WITH & W/O-POST PROCESSING   Final Result         1.  Slight narrowing of the distal left internal carotid artery near the bony carotid canal, could represent underlying soft plaque or vascular spasm.         DX-CHEST-PORTABLE (1 VIEW)   Final Result      Mild left basilar atelectasis      CT-HEAD W/O    (Results Pending)       Assessment and Plan:    Alireza Valenzuela is a 57 y.o. male with relevant history of hypertension (untreated, patient does not see a PCP) who presented on 7/27/22 presenting for double vision, vertigo, and vision loss for which neurology was consulted 7/28/22  for stroke management. CTA head and neck w/wo contrast revealed a hypodensity c/w subacute infarct of the left parietal lobe and soft atherosclerotic plaque of distal left ICA without flow limiting stenosis nor occlusion amendable to endovascular clot retrieval. Additionally, he was not a candidate for acute intervention with IV thrombolytics given completed infarct with presentation well outside therapeutic window. MRI brain wo contrast revealed a subacute left parietal infarct as well as multifocal punctate infarcts of the left anterior frontal lobe, left frontal convexity, and left occipital cortex in the border zone territory. Neurological exam correlates to the left occipital lobe infarct with right homonymous quadrantanopsia of the right lower vision field (NIHSS 1). Etiology of these subacute ischemic infarcts is most likely large vessel atherosclerosis with artery to artery atheroemboli.     This morning, the patient has approximately 2 witnessed focal seizures that evolved into generalized tonic-clonic seizures with semiology consistent with his left parietal infarct serving as the nidus. This meets criteria for localization-related (focal) epilepsy, thus Keppra 30mg/kg load and maintenance dose Keppra 1g q12hr was ordered.     Problem List:  1. Subacute multifocal ischemic stroke of left parietal, left frontal, and left occipital lobe   2. Localization-related (focal) epilepsy     Plan:     1. Subacute multifocal ischemic stroke of left parietal, left frontal, and left occipital lobe   -q4h and PRN neuro assessment. VS per nursing/unit protocol.   -Normotensive BP goal 110-130/60-80.   -Antihypertensives per primary team.   -Telemetry; currently SR.    -Obtain TTE with bubble study.   -Outpatient extended cardiac event monitoring for completion of embolic workup (eg. Zio patch).               -Placement prior to discharge.   -DAPT with Plavix 75 mg PO daily for 10 days and ASA 81 mg PO daily, followed by  monotherapy with ASA only  -Lipid panel reveals triglycerides 515 and LDL unable to be calculated              -Atorvastatin 80 mg PO q HS for LDL goal <70.               -Agree with additional fenofibrate for triglycerides goal <150  -Aggressive BG management per primary team. BG goal . Note hemoglobin A1c 12.4%, goal <7%   -PT/OT/SLP eval and treat.   -Counseled patient at length regarding life style and risk factor modification for secondary stroke prevention including BP management and smoking cessation  -Follow up outpatient at [facility]. Referral placed.   -Establish care with PCP for follow up primary medical management  -DVT PPX: SCDs and lovenox SQ daily    2. Localization-related (focal) epilepsy  -Keppra 30mg/hr IV load- given, then Keppra 1g IV q12hr maintenance dose  -STAT CT head- appears stable   -STAT video EEG- ordered and pending  -Aspiration and seizure precautions  -[facility] up [facility] at [facility]. Referral placed.    The evaluation of the patient, and recommended management, was discussed with Dr. Redd, Dr. Issa, and bedside RN. I have performed a physical exam and reviewed and updated ROS and Plan today (7/29/2022). In review of yesterday's note (7/28/2022), there are no changes except as documented above.    Jalen Castro, MSN AGACNP-BC  Nurse Practitioner  [facility]  (t) [phone]

## 2022-07-29 NOTE — DISCHARGE PLANNING
Case Management Discharge Planning    Admission Date: 7/27/2022  GMLOS: 2  ALOS: 1    6-Clicks ADL Score: 24  6-Clicks Mobility Score: 24      Anticipated Discharge Dispo: Discharge Disposition: Discharged to home/self care (01)  Discharge Contact Phone Number: [phone]    DME Needed: None    Action(s) Taken: Pts daughter, Noelle requesting to speak with RN MEREDITH.  I telephoned her today.  Noelle is driving to [place] now from [place] and will be at hospital approximately 1630 today.  She stated that patient is a  and he has been staying in [place], [place] with his friend for a month and living out of his truck.  Pt is homeless.  He came to [place] from [place], [place].  Pts friend is also a  and on the road most of the time.  Noelle stated she will be going back to [place] on Sunday as she has to work on Monday.  She stated she is worried about her father and where he will live.  She and her sister, unfortunately, are unable to have him stay with them.  Pt is a .  I telephoned [facility].  [facility] not [facility] with [facility].  I provided the telephone # to Cammie for [facility] National Enrollment and Eligibility.  Pt uninsured.  Pt had 2 seizures this a.m.      Medically Clear: No    Next Steps: Follow up with patient and daughter regarding dc plan.    Barriers to Discharge: Uninsured, homeless    Care Transition Team Assessment    Information Source  Orientation Level: Oriented X4  Who is responsible for making decisions for patient? : Patient    Readmission Evaluation  Is this a readmission?: No    Elopement Risk  Legal Hold: No  Ambulatory or Self Mobile in Wheelchair: Yes  Disoriented: No  Psychiatric Symptoms: None  History of Wandering: No  Elopement this Admit: No  Vocalizing Wanting to Leave: No  Displays Behaviors, Body Language Wanting to Leave: No-Not at Risk for Elopement  Elopement Risk: Not at Risk for Elopement    Interdisciplinary Discharge Planning  Lives with - Patient's  Self Care Capacity: Alone and Able to Care For Self  Patient or legal guardian wants to designate a caregiver: No  Housing / Facility: [address]  Prior Services: Home-Independent, None    Discharge Preparedness  What is your plan after discharge?: Uncertain - pending medical team collaboration  What are your discharge supports?: Child  Prior Functional Level: Ambulatory, Drives Self, Independent with Activities of Daily Living, Independent with Medication Management  Difficulity with ADLs: None  Difficulity with IADLs: Driving    Functional Assesment  Prior Functional Level: Ambulatory, Drives Self, Independent with Activities of Daily Living, Independent with Medication Management    Finances  Financial Barriers to Discharge: Yes  Prescription Coverage: No              Advance Directive  Advance Directive?: None    Domestic Abuse  Have you ever been the victim of abuse or violence?: No         Discharge Risks or Barriers  Discharge risks or barriers?: No PCP, Uninsured / underinsured, Homeless / couch surfing    Anticipated Discharge Information  Discharge Disposition: Discharged to home/self care (01)  Discharge Contact Phone Number: [phone]

## 2022-07-29 NOTE — CARE PLAN
The patient is Stable - Low risk of patient condition declining or worsening    Shift Goals  Clinical Goals: remain free from seizures; safety  Patient Goals: none stated    Progress made toward(s) clinical / shift goals:  Patient at risk for injury due to seizures, bed alarm on, walker out of sight, nonskid socks on, call light within reach, personal belongings within reach, toileting offered, seizure precautions in place, side rails padded, 3 side rails up,  in use, education provided on need to call for assistance.           Patient is not progressing towards the following goals:

## 2022-07-29 NOTE — PROGRESS NOTES
During bedside shift report NIHSS patient became acutely disoriented and displayed right sided muscle spacticity and jerking which lead to bilateral whole body seizure like convulsions and right sided visual deviation. Patients heart rate increased and sustained in the 150's, and SpO2 decreased to the 80's. 5 liters oxygen applied via oxy mask and bed was lowered.   Seizure like activity lasted approximately 1-2 mins total. Patient was post ictle with right sided weakness for approximately 5 mins before returning to baseline     Md called to bedside. STAT head CT ordered.     Within 10 mins the patient had a second episode with the same presentation lasting approximately 1 min, MD was called to bedside, 2 mg of IV lorazepam administered.

## 2022-07-29 NOTE — PROGRESS NOTES
Hospital Medicine Daily Progress Note    Date of Service  7/29/2022    Chief Complaint  Alireza Valenzuela is a 57 y.o. male admitted 7/27/2022 with cerebrovascular accident.    Hospital Course  57-year-old with a history of uncontrolled diabetes and hypertension presented with changes in vision, loss of balance and headache.  CTA head and neck revealed subacute infarct of the left parietal lobe and soft atherosclerotic plaque of distal left ICA without flow-limiting stenosis or occlusion amenable to endovascular clot retrieval.  He was not a candidate for tPA.  MRI brain without contrast showed subacute left parietal infarct as well as multifocal punctate infarcts of the left anterior frontal lobe, left frontal convexity, and left occipital cortex in the border zone territory.  Etiology of the subacute infarcts likely large vessel atherosclerosis with artery to artery arterial emboli.  Hospital course complicated by seizures (focal then became generalized).  EEG however was negative.  He was loaded with Keppra and started on Keppra 1 g twice daily.  Echo normal.  Zio patch will need to be placed at discharge.  He was started on Plavix 75 mg p.o. daily x10 days and aspirin 81 mg p.o. daily followed by monotherapy with aspirin only.  He was started on atorvastatin 80 mg nightly as well as fenofibrate.  He was found to have an A1c of 12.4.  He was started on insulin therapy.    Interval Problem Update  - episode of seizure today, initially focal, R arm and eye deviation then generalized TC with tachycardia to 150s, hypoxia requiring 6L facemask, I came to bedside and he was back to baseline, then a few minutes later had focal seizure again with R arm shaking and eye deviation. Gave ativan IV, stat head CT without acute findings. EEG negative. Loaded with keppra.       I have discussed this patient's plan of care and discharge plan at IDT rounds today with Case Management, Nursing, Nursing leadership, and other  members of the IDT team.    Consultants/Specialty  neurology    Code Status  Full Code    Disposition  Patient is not medically cleared for discharge.   Anticipate discharge to to home with close outpatient follow-up.  I have placed the appropriate orders for post-discharge needs.    Review of Systems  Review of Systems   Constitutional: Negative for chills, diaphoresis, fever and malaise/fatigue.   HENT: Negative for congestion and hearing loss.    Eyes: Positive for blurred vision and double vision. Negative for photophobia and pain.   Respiratory: Negative for cough and shortness of breath.    Cardiovascular: Negative for chest pain, palpitations and leg swelling.   Gastrointestinal: Positive for nausea. Negative for abdominal pain and vomiting.   Genitourinary: Negative for dysuria and flank pain.   Musculoskeletal: Negative for back pain, joint pain and myalgias.   Neurological: Positive for dizziness and seizures. Negative for tremors, sensory change, speech change, focal weakness, loss of consciousness, weakness and headaches.   Psychiatric/Behavioral: Negative for depression and memory loss. The patient is not nervous/anxious.         Physical Exam  Temp:  [36.7 °C (98.1 °F)-37.2 °C (99 °F)] 36.7 °C (98.1 °F)  Pulse:  [] 101  Resp:  [16-18] 18  BP: ()/(53-85) 96/53  SpO2:  [89 %-93 %] 93 %    Physical Exam  Vitals and nursing note reviewed.   Constitutional:       General: He is not in acute distress.     Appearance: He is not ill-appearing or diaphoretic.   HENT:      Head: Normocephalic and atraumatic.      Nose: Nose normal.   Eyes:      General: No scleral icterus.        Right eye: No discharge.         Left eye: No discharge.      Extraocular Movements: Extraocular movements intact.      Pupils: Pupils are equal, round, and reactive to light.   Neck:      Thyroid: No thyromegaly.      Vascular: No JVD.   Cardiovascular:      Rate and Rhythm: Regular rhythm. Tachycardia present.      Heart  sounds: No murmur heard.    No friction rub. No gallop.   Pulmonary:      Effort: No respiratory distress.      Breath sounds: Normal breath sounds. No wheezing or rales.   Abdominal:      General: Bowel sounds are normal. There is no distension.      Palpations: Abdomen is soft.      Tenderness: There is no abdominal tenderness.   Musculoskeletal:         General: No swelling or tenderness.      Cervical back: Neck supple.      Right lower leg: No edema.      Left lower leg: No edema.   Skin:     General: Skin is warm and dry.      Findings: No erythema or rash.   Neurological:      Mental Status: He is alert and oriented to person, place, and time.      Cranial Nerves: No cranial nerve deficit.      Sensory: No sensory deficit.      Motor: No weakness.      Coordination: Coordination normal.   Psychiatric:         Mood and Affect: Mood is anxious.         Fluids    Intake/Output Summary (Last 24 hours) at 7/29/2022 2365  Last data filed at 7/29/2022 0300  Gross per 24 hour   Intake --   Output 300 ml   Net -300 ml       Laboratory  Recent Labs     07/27/22  2020 07/29/22  0243   WBC 12.1*  12.1* 12.8*   RBC 5.55  5.55 5.43   HEMOGLOBIN 16.2  16.2 15.8   HEMATOCRIT 47.0  47.0 46.8   MCV 84.7  84.7 86.2   MCH 29.2  29.2 29.1   MCHC 34.5  34.5 33.8   RDW 38.0  38.0 39.1   PLATELETCT 315  315 326   MPV 10.0  10.0 9.8     Recent Labs     07/27/22  2020 07/29/22  0243   SODIUM 136 136   POTASSIUM 4.0 3.4*   CHLORIDE 99 100   CO2 24 25   GLUCOSE 413* 186*   BUN 7* 12   CREATININE 0.83 0.68   CALCIUM 9.4 9.1     Recent Labs     07/27/22  2107   APTT 28.2   INR 1.03         Recent Labs     07/27/22  2139   TRIGLYCERIDE 515*   HDL 28*   LDL see below       Imaging  CT-HEAD W/O   Final Result      1. Stable left parietal cortical infarction with linear area of increased attenuation centrally that could represent a small amount of hemorrhage. No progression in the interval.   2. The additional punctate areas of left  cerebral cortical infarction seen on the prior brain MRI are not discernible on the current exam.   3. Diffuse chronic microangiopathic white matter changes.         EC-ECHOCARDIOGRAM COMPLETE W/O CONT   Final Result      MR-BRAIN-W/O   Final Result      1.  Well-circumscribed 24 mm x 17 mm area of acute infarction in the left parietal lobe subcortical and deep white matter. Question of minimal hemorrhagic transformation with a thin curvilinear area of gradient echo hypointensity.   2.  Additional multifocal subcentimeter and punctate areas of acute infarction involving the left anterior frontal lobe, left frontal convexity, and left occipital cortex. There appears to be carotid origin of the posterior cerebral arteries and these    lesions may all be within the territory of the left internal carotid artery. Nonetheless, an embolic mechanism would be considered.   3.  Minimal supratentorial white matter disease elsewhere in the cerebral hemispheres most consistent with microvascular ischemic change.      CT-CTA HEAD WITH & W/O-POST PROCESS   Final Result         1.  No large vessel occlusion or aneurysm identified.   2.  Area of low-density in the left parietal lobe near the vertex, appearance suggests area of prior infarct. Follow-up MRI of the brain with contrast to definitively exclude intracranial lesion due to relative discrete appearance.      CT-CTA NECK WITH & W/O-POST PROCESSING   Final Result         1.  Slight narrowing of the distal left internal carotid artery near the bony carotid canal, could represent underlying soft plaque or vascular spasm.         DX-CHEST-PORTABLE (1 VIEW)   Final Result      Mild left basilar atelectasis           Assessment/Plan  * CVA (cerebral vascular accident) (HCC)  Assessment & Plan  Admit patient to neurology floor  CT shows Area of low-density in the left parietal lobe near the vertex, appearance suggests area of prior infarct.   Neuro on board, recommends CVA  workup  MRI brain wo contrast   Neuro check Q4H   No need for permissive HTN as symptoms started three weeks ago     7/28 start aspirin and statin  Triglyceride of 515, will initiate fibrate's  MRI pending  CTA of the head with left parietal lobe, but possible prior infarct  CT of the neck left ICA occlusion near the canal plaque versus spasm  Neurology to follow-up  Follow-up echocardiogram  Continue telemetry monitoring    7/29: MRI reviewed: acute infarction left parietal lobe  Echo: reviewed: normal      Hypertriglyceridemia  Assessment & Plan  Fenofibrate + statin     Seizure (HCC)  Assessment & Plan  Had seizure episode today, focal then became GTC  STAT CTH stable  EEG negative  Neuro already following: recommended keppra load + 1g BID    Tobacco abuse  Assessment & Plan  Counseled on benefits of tobacco cessation     Elevated troponin  Assessment & Plan  Troponin 103 -> 105, no further ACS trend.    EKG changes noted. No chest pain.  TTE normal  Optimize BP and glycemia   Asymptomatic, denies chest pain  Likely in setting of stroke     Uncontrolled diabetes mellitus with hyperglycemia (HCC)  Assessment & Plan  Noted to have uncontrolled hyperglycemia  Lantus, sliding scale   A1c 12.4  DM education, new diagnosis  Metformin at discharge + DM supplies        VTE prophylaxis: SCDs/TEDs and enoxaparin ppx    I have performed a physical exam and reviewed and updated ROS and Plan today (7/29/2022). In review of yesterday's note (7/28/2022), there are no changes except as documented above.

## 2022-07-29 NOTE — PROGRESS NOTES
Monitor summary: SR 75-86, PA 0.16, QRS 0.10, QT 0.45, with rare PVCs per strip from monitor room.

## 2022-07-29 NOTE — DOCUMENTATION QUERY
[facility]                                                                       Query Response Note      PATIENT:               MORRIS SCHULTZ  ACCT #:                  [id number]  MRN:                     [id number]  :                      1965  ADMIT DATE:       2022 7:04 PM  DISCH DATE:          RESPONDING  PROVIDER #:        [id number]           QUERY TEXT:    Hypertension is documented in the medical record.  Please specify the type of hypertension.    NOTE:  If an appropriate response is not listed below, please respond with a new note.    The patient's Clinical Indicators include:  Patient admitted with CVA  Additional diagnosis of Hypertension  Clinical Indicators - BP range 140-180's/80's-100's                                   - c/o headache, diplopia in setting of CVA and newly diagnosed DM2                                   - elevated troponin, possibly consistent with demand in setting of hypertensive crisis                                      per Cardiac EP progress note  TX: IVP labetalol x 1  Options provided:   -- Hypertensive Crisis   -- Hypertensive urgency   -- Uncontrolled Essential Hypertension   -- Other, please specify _________________________________      Query created by: Constance Villa on 2022 6:31 AM    RESPONSE TEXT:    Uncontrolled Essential Hypertension          Electronically signed by:  MARANDA BARKER DO 2022 7:26 AM

## 2022-07-29 NOTE — PROCEDURES
INPATIENT ROUTINE VIDEO ELECTROENCEPHALOGRAM REPORT      Referring provider:   Xin Issa M.D.    DOS: 07/29/22 (0 hours and 26 minutes of total recording time).     INDICATION:  Alireza Valenzuela 57 y.o. male presenting with seizure(s)    CURRENT ANTIEPILEPTIC AND/OR SEDATING REGIMEN:   Keppra (levetiracitam) (3330mg once, 1000mg Q12)  Ativan (lorazepam) (2mg @ 0756)    TECHNIQUE: Routine VEEG was set up by a Neurodiagnostic technologist who performed education to the patient and staff. A minimum of 23 electrodes and 23 channel recording was setup and performed by Neurodiagnostic technologist, in accordance with the international 10-20 system. The study was reviewed in bipolar and referential montages. The recording examined the patient in the  awake, drowsy, and sleep state(s).     DESCRIPTION OF THE RECORD:  During maximal wakefulness, the background was continuous and showed a 9-10 Hz posterior dominant rhythm, with a mixture of beta/alpha frequencies.  Reactivity and state changes were present .  During drowsiness, theta/delta frequencies were seen.     EEG Sleep: Sleep was captured and was characterized by diffuse background delta/theta activity with a loss of myogenic artifact.  N2 sleep transients in the form of sleep spindles and vertex waves were seen in the leads over the central regions.     ACTIVATION PROCEDURES:   Intermittent Photic stimulation was performed in a stepwise fashion from 1 to 30 Hz and did not elicited any abnormalities on EEG.     ICTAL AND INTERICTAL FINDINGS:   No focal or generalized epileptiform activity noted.     No regional slowing was seen during this routine study.      No definite electrographic or electroclinical seizures.     EKG: Sampling of the EKG recording was abnormal      EVENTS:  None    INTERPRETATION:   Normal video EEG recording in the awake, drowsy, and sleep state(s):  - No persistent focal asymmetries seen.  - No definitive epileptiform discharges  or other epileptiform phenomena seen   - No seizures. Clinical correlation is recommended.      Note: If the clinical suspicion remains high for seizures, a prolonged recording to capture clinical or subclinical events may be helpful.        Omero Santos MD  Department of Neurology at [facility]  General Neurologist and Epileptologist  Director of [facility] Level III Comprehensive Epilepsy Program  Professor of Clinical Neurology, [facility].   Phone: [phone]  Fax: [phone]  E-mail: [email or web address]

## 2022-07-29 NOTE — ASSESSMENT & PLAN NOTE
Admit patient to neurology floor  CT shows Area of low-density in the left parietal lobe near the vertex, appearance suggests area of prior infarct.   Neuro on board, recommends CVA workup  MRI brain wo contrast   Neuro check Q4H   No need for permissive HTN as symptoms started three weeks ago     7/28 start aspirin and statin  Triglyceride of 515, will initiate fibrate's  MRI pending  CTA of the head with left parietal lobe, but possible prior infarct  CT of the neck left ICA occlusion near the canal plaque versus spasm  Neurology to follow-up  Follow-up echocardiogram  Continue telemetry monitoring    7/29: MRI reviewed: acute infarction left parietal lobe  Echo: reviewed: normal    
Counseled on benefits of tobacco cessation   
Fenofibrate + statin   
Had seizure episode today, focal then became GTC  STAT CTH stable  EEG negative  Neuro already following: recommended keppra load + 1g BID  
Noted to have uncontrolled hyperglycemia  Lantus, sliding scale   A1c 12.4  DM education, new diagnosis  Metformin at discharge + DM supplies   
Troponin 103 -> 105, no further ACS trend.    EKG changes noted. No chest pain.  TTE normal  Optimize BP and glycemia   Asymptomatic, denies chest pain  Likely in setting of stroke   
Statement Selected

## 2022-07-29 NOTE — DIETARY
Nutrition Services: Diabetes Education Consult   Day 1 of admit.  Alireza Valenzuela is a 57 y.o. male with admitting DX of Acute CVA (cerebrovascular accident) (HCC) [I63.9]  Recurrent cerebrovascular accidents (CVAs) (HCC) [I63.9]    RD able to visit pt at bedside to provide diabetes education. RD discussed the plate method/balanced meals, carbohydrate impact on blood sugar, pairing carb foods with fiber and protein, carb counting, and nutrition label. RD provided handout reinforcing topics discussed. Pt demonstrated readiness and verbal evidence of learning. RD able to answer all questions to patient's satisfaction.     No other education needs identified at this time. Consider referral to outpatient nutrition services for continuation of education as indicated or per pt preferences.     Please re-consult RD as indicated.

## 2022-07-30 ENCOUNTER — PHARMACY VISIT (OUTPATIENT)
Dept: PHARMACY | Facility: MEDICAL CENTER | Age: 57
End: 2022-07-30
Payer: COMMERCIAL

## 2022-07-30 VITALS
HEIGHT: 76 IN | TEMPERATURE: 98.6 F | OXYGEN SATURATION: 90 % | HEART RATE: 82 BPM | DIASTOLIC BLOOD PRESSURE: 68 MMHG | WEIGHT: 245 LBS | BODY MASS INDEX: 29.83 KG/M2 | SYSTOLIC BLOOD PRESSURE: 120 MMHG | RESPIRATION RATE: 18 BRPM

## 2022-07-30 LAB
ALBUMIN SERPL BCP-MCNC: 3.5 G/DL (ref 3.2–4.9)
ALBUMIN/GLOB SERPL: 1.3 G/DL
ALP SERPL-CCNC: 94 U/L (ref 30–99)
ALT SERPL-CCNC: 18 U/L (ref 2–50)
ANION GAP SERPL CALC-SCNC: 11 MMOL/L (ref 7–16)
AST SERPL-CCNC: 20 U/L (ref 12–45)
BILIRUB SERPL-MCNC: 0.4 MG/DL (ref 0.1–1.5)
BUN SERPL-MCNC: 14 MG/DL (ref 8–22)
CALCIUM SERPL-MCNC: 8.7 MG/DL (ref 8.5–10.5)
CHLORIDE SERPL-SCNC: 104 MMOL/L (ref 96–112)
CO2 SERPL-SCNC: 23 MMOL/L (ref 20–33)
CREAT SERPL-MCNC: 0.74 MG/DL (ref 0.5–1.4)
ERYTHROCYTE [DISTWIDTH] IN BLOOD BY AUTOMATED COUNT: 40 FL (ref 35.9–50)
GFR SERPLBLD CREATININE-BSD FMLA CKD-EPI: 106 ML/MIN/1.73 M 2
GLOBULIN SER CALC-MCNC: 2.6 G/DL (ref 1.9–3.5)
GLUCOSE BLD STRIP.AUTO-MCNC: 181 MG/DL (ref 65–99)
GLUCOSE BLD STRIP.AUTO-MCNC: 293 MG/DL (ref 65–99)
GLUCOSE SERPL-MCNC: 171 MG/DL (ref 65–99)
HCT VFR BLD AUTO: 46 % (ref 42–52)
HGB BLD-MCNC: 15.3 G/DL (ref 14–18)
MCH RBC QN AUTO: 28.9 PG (ref 27–33)
MCHC RBC AUTO-ENTMCNC: 33.3 G/DL (ref 33.7–35.3)
MCV RBC AUTO: 86.8 FL (ref 81.4–97.8)
PLATELET # BLD AUTO: 307 K/UL (ref 164–446)
PMV BLD AUTO: 9.7 FL (ref 9–12.9)
POTASSIUM SERPL-SCNC: 3.9 MMOL/L (ref 3.6–5.5)
PROT SERPL-MCNC: 6.1 G/DL (ref 6–8.2)
RBC # BLD AUTO: 5.3 M/UL (ref 4.7–6.1)
SODIUM SERPL-SCNC: 138 MMOL/L (ref 135–145)
WBC # BLD AUTO: 11.9 K/UL (ref 4.8–10.8)

## 2022-07-30 PROCEDURE — 99232 SBSQ HOSP IP/OBS MODERATE 35: CPT | Performed by: NURSE PRACTITIONER

## 2022-07-30 PROCEDURE — 700102 HCHG RX REV CODE 250 W/ 637 OVERRIDE(OP): Performed by: STUDENT IN AN ORGANIZED HEALTH CARE EDUCATION/TRAINING PROGRAM

## 2022-07-30 PROCEDURE — 85027 COMPLETE CBC AUTOMATED: CPT

## 2022-07-30 PROCEDURE — 700111 HCHG RX REV CODE 636 W/ 250 OVERRIDE (IP): Performed by: NURSE PRACTITIONER

## 2022-07-30 PROCEDURE — 82962 GLUCOSE BLOOD TEST: CPT

## 2022-07-30 PROCEDURE — A9270 NON-COVERED ITEM OR SERVICE: HCPCS | Performed by: NURSE PRACTITIONER

## 2022-07-30 PROCEDURE — A9270 NON-COVERED ITEM OR SERVICE: HCPCS | Performed by: INTERNAL MEDICINE

## 2022-07-30 PROCEDURE — 700102 HCHG RX REV CODE 250 W/ 637 OVERRIDE(OP): Performed by: INTERNAL MEDICINE

## 2022-07-30 PROCEDURE — 80053 COMPREHEN METABOLIC PANEL: CPT

## 2022-07-30 PROCEDURE — A9270 NON-COVERED ITEM OR SERVICE: HCPCS | Performed by: STUDENT IN AN ORGANIZED HEALTH CARE EDUCATION/TRAINING PROGRAM

## 2022-07-30 PROCEDURE — RXMED WILLOW AMBULATORY MEDICATION CHARGE: Performed by: INTERNAL MEDICINE

## 2022-07-30 PROCEDURE — 700102 HCHG RX REV CODE 250 W/ 637 OVERRIDE(OP): Performed by: NURSE PRACTITIONER

## 2022-07-30 PROCEDURE — 99239 HOSP IP/OBS DSCHRG MGMT >30: CPT | Performed by: INTERNAL MEDICINE

## 2022-07-30 PROCEDURE — 36415 COLL VENOUS BLD VENIPUNCTURE: CPT

## 2022-07-30 RX ORDER — LANCETS
EACH MISCELLANEOUS
Qty: 100 EACH | Refills: 0 | Status: SHIPPED | OUTPATIENT
Start: 2022-07-30

## 2022-07-30 RX ORDER — LEVETIRACETAM 1000 MG/1
1000 TABLET ORAL 2 TIMES DAILY
Qty: 60 TABLET | Refills: 1 | Status: SHIPPED | OUTPATIENT
Start: 2022-07-30

## 2022-07-30 RX ORDER — DIPHENHYDRAMINE HCL 25 MG
TABLET ORAL
Qty: 1 KIT | Refills: 1 | Status: SHIPPED | OUTPATIENT
Start: 2022-07-30

## 2022-07-30 RX ORDER — INSULIN GLARGINE 100 [IU]/ML
10 INJECTION, SOLUTION SUBCUTANEOUS EVERY EVENING
Qty: 3 ML | Refills: 1 | Status: SHIPPED | OUTPATIENT
Start: 2022-07-30

## 2022-07-30 RX ORDER — INSULIN LISPRO 100 [IU]/ML
0-6 INJECTION, SOLUTION INTRAVENOUS; SUBCUTANEOUS
Qty: 6 ML | Refills: 1 | Status: SHIPPED | OUTPATIENT
Start: 2022-07-30

## 2022-07-30 RX ORDER — CLOPIDOGREL BISULFATE 75 MG/1
75 TABLET ORAL DAILY
Qty: 30 TABLET | Refills: 1 | Status: SHIPPED | OUTPATIENT
Start: 2022-07-31

## 2022-07-30 RX ORDER — ATORVASTATIN CALCIUM 80 MG/1
80 TABLET, FILM COATED ORAL EVERY EVENING
Qty: 30 TABLET | Refills: 1 | Status: SHIPPED | OUTPATIENT
Start: 2022-07-30

## 2022-07-30 RX ORDER — FENOFIBRATE 67 MG/1
67 CAPSULE ORAL DAILY
Qty: 30 CAPSULE | Refills: 1 | Status: SHIPPED | OUTPATIENT
Start: 2022-07-31

## 2022-07-30 RX ORDER — LEVETIRACETAM 500 MG/1
1000 TABLET ORAL 2 TIMES DAILY
Status: DISCONTINUED | OUTPATIENT
Start: 2022-07-30 | End: 2022-07-30 | Stop reason: HOSPADM

## 2022-07-30 RX ORDER — GLUCOSAMINE HCL/CHONDROITIN SU 500-400 MG
CAPSULE ORAL
Qty: 100 EACH | Refills: 0 | Status: SHIPPED | OUTPATIENT
Start: 2022-07-30

## 2022-07-30 RX ORDER — ASPIRIN 81 MG/1
81 TABLET, CHEWABLE ORAL DAILY
Qty: 100 TABLET | Refills: 1 | Status: SHIPPED | OUTPATIENT
Start: 2022-07-31

## 2022-07-30 RX ORDER — LISINOPRIL 10 MG/1
10 TABLET ORAL DAILY
Qty: 30 TABLET | Refills: 1 | Status: SHIPPED | OUTPATIENT
Start: 2022-07-31

## 2022-07-30 RX ADMIN — INSULIN HUMAN 3 UNITS: 100 INJECTION, SOLUTION PARENTERAL at 12:00

## 2022-07-30 RX ADMIN — LEVETIRACETAM 1000 MG: 100 INJECTION, SOLUTION INTRAVENOUS at 06:32

## 2022-07-30 RX ADMIN — CLOPIDOGREL BISULFATE 75 MG: 75 TABLET ORAL at 06:32

## 2022-07-30 RX ADMIN — FENOFIBRATE 67 MG: 67 CAPSULE ORAL at 06:32

## 2022-07-30 RX ADMIN — ASPIRIN 81 MG: 81 TABLET, CHEWABLE ORAL at 06:32

## 2022-07-30 RX ADMIN — INSULIN HUMAN 1 UNITS: 100 INJECTION, SOLUTION PARENTERAL at 06:33

## 2022-07-30 RX ADMIN — LISINOPRIL 10 MG: 10 TABLET ORAL at 06:32

## 2022-07-30 ASSESSMENT — PAIN DESCRIPTION - PAIN TYPE: TYPE: ACUTE PAIN

## 2022-07-30 NOTE — HOSPITAL COURSE
57-year-old with a history of uncontrolled diabetes and hypertension presented with changes in vision, loss of balance and headache.  CTA head and neck revealed subacute infarct of the left parietal lobe and soft atherosclerotic plaque of distal left ICA without flow-limiting stenosis or occlusion amenable to endovascular clot retrieval.  He was not a candidate for tPA.  MRI brain without contrast showed subacute left parietal infarct as well as multifocal punctate infarcts of the left anterior frontal lobe, left frontal convexity, and left occipital cortex in the border zone territory.  Etiology of the subacute infarcts likely large vessel atherosclerosis with artery to artery arterial emboli.  Hospital course complicated by seizures (focal then became generalized).  EEG however was negative.  He was loaded with Keppra and started on Keppra 1 g twice daily.  Echo normal.  Zio patch will need to be placed at discharge.  He was started on Plavix 75 mg p.o. daily x10 days and aspirin 81 mg p.o. daily followed by monotherapy with aspirin only.  He was started on atorvastatin 80 mg nightly as well as fenofibrate.  He was found to have an A1c of 12.4.  He was started on insulin therapy.

## 2022-07-30 NOTE — PROGRESS NOTES
Chief Complaint   Patient presents with    Diplopia    Loss Of Balance    Headache       Problem List Items Addressed This Visit       * (Principal) CVA (cerebral vascular accident) (HCC)     Admit patient to neurology floor  CT shows Area of low-density in the left parietal lobe near the vertex, appearance suggests area of prior infarct.   Neuro on board, recommends CVA workup  MRI brain wo contrast   Neuro check Q4H   No need for permissive HTN as symptoms started three weeks ago     7/28 start aspirin and statin  Triglyceride of 515, will initiate fibrate's  MRI pending  CTA of the head with left parietal lobe, but possible prior infarct  CT of the neck left ICA occlusion near the canal plaque versus spasm  Neurology to follow-up  Follow-up echocardiogram  Continue telemetry monitoring    7/29: MRI reviewed: acute infarction left parietal lobe  Echo: reviewed: normal             Relevant Medications    labetalol (NORMODYNE/TRANDATE) injection 10 mg    lisinopril (PRINIVIL) tablet 10 mg    atorvastatin (LIPITOR) tablet 80 mg    fenofibrate micronized (LOFIBRA) capsule 67 mg    enoxaparin (Lovenox) inj 40 mg    Acute CVA (cerebrovascular accident) (HCC)    Relevant Medications    labetalol (NORMODYNE/TRANDATE) injection 10 mg    lisinopril (PRINIVIL) tablet 10 mg    atorvastatin (LIPITOR) tablet 80 mg    fenofibrate micronized (LOFIBRA) capsule 67 mg    enoxaparin (Lovenox) inj 40 mg    Other Relevant Orders    Referral to Physiatry (PMR)    Referral to Neurology          Other Visit Diagnoses       Diplopia        Nonintractable headache, unspecified chronicity pattern, unspecified headache type        Balance problem              Neurology Progress Note    Brief History of present illness:  57-year old male with PMHX significant for tobacco abuse, hypertension who presented to [facility] on 7/27/22 for a chief complaint of vision changes, vertigo found to have embolic appearing L frontoparietal infarcts.  CT angiogram without flow limiting stenosis, evidence of underlying atherosclerotic disease.  Further work up is in progress.     Interval, 7/30/22:  Patient is laying in bed; arousable, oriented to self, place, time. Family at bedside reports intermittent confusion, possibly baseline. No events overnight per review of nursing notes.     Notably, on 7/29 morning, the patient had a witnessed seizure like event characterized by convulsions, generalizing to full body tonic-clonic seizure lasting 1-2 minutes total with associated tachycardia HR 150s, hypoxia SpO2 80%. EEG normal study; no additional seizure like events reported. Patient started on Keppra 1000 mg PO BID (may plan to wean as outpatient).     No changes to HPI as was previously documented.    Past medical history:   Past Medical History:   Diagnosis Date    Patient denies medical problems        Past surgical history:   History reviewed. No pertinent surgical history.    Family history:   Family History   Problem Relation Age of Onset    Diabetes Mother     Heart Attack Father 50    Thyroid Sister     Thyroid Sister     No Known Problems Brother     Stroke Maternal Grandmother         Embolic       Social history:   Social History     Socioeconomic History    Marital status: Single     Spouse name: Not on file    Number of children: Not on file    Years of education: Not on file    Highest education level: Not on file   Occupational History    Occupation:    Tobacco Use    Smoking status: Current Every Day Smoker     Packs/day: 1.00     Years: 40.00     Pack years: 40.00     Types: Cigarettes    Smokeless tobacco: Former User     Types: Chew    Tobacco comment: Chewed during  service when he couldn't smoke   Substance and Sexual Activity    Alcohol use: Yes     Alcohol/week: 1.2 oz     Types: 2 Cans of beer per week     Comment: Works 6 days a week, has a beer or two on days off    Drug use: Never    Sexual activity: Not on file   Other  Topics Concern    Not on file   Social History Narrative    Not on file     Social Determinants of Health     Financial Resource Strain: Not on file   Food Insecurity: Not on file   Transportation Needs: Not on file   Physical Activity: Not on file   Stress: Not on file   Social Connections: Not on file   Intimate Partner Violence: Not on file   Housing Stability: Not on file       Current medications:   Current Facility-Administered Medications   Medication Dose    levETIRAcetam (KEPPRA) tablet 1,000 mg  1,000 mg    LORazepam (ATIVAN) injection 4 mg  4 mg    insulin GLARGINE (Lantus,Semglee) injection  10 Units    enoxaparin (Lovenox) inj 40 mg  40 mg    aspirin (ASA) chewable tab 81 mg  81 mg    atorvastatin (LIPITOR) tablet 80 mg  80 mg    fenofibrate micronized (LOFIBRA) capsule 67 mg  67 mg    clopidogrel (PLAVIX) tablet 75 mg  75 mg    acetaminophen (Tylenol) tablet 650 mg  650 mg    labetalol (NORMODYNE/TRANDATE) injection 10 mg  10 mg    insulin regular (HumuLIN R,NovoLIN R) injection  1-6 Units    And    dextrose 50% (D50W) injection 25 g  25 g    lisinopril (PRINIVIL) tablet 10 mg  10 mg       Medication Allergy:  No Known Allergies    Review of systems:   Constitutional: denies fever, night sweats, weight loss.   Eyes: denies acute vision change, eye pain or secretion.   Ears, Nose, Mouth, Throat: denies nasal secretion, nasal bleeding, difficulty swallowing, hearing loss, tinnitus, vertigo, ear pain, acute dental problems, oral ulcers or lesions.   Endocrine: denies recent weight changes, heat or cold intolerance, polyuria, polydypsia, polyphagia,abnormal hair growth.  Cardiovascular: denies new onset of chest pain, palpitations, syncope, or dyspnea of exertion.  Pulmonary: denies shortness of breath, new onset of cough, hemoptysis, wheezing, chest pain or flu-like symptoms.   GI: denies nausea, vomiting, diarrhea, GI bleeding, change in appetite, abdominal pain, and change in bowel habits.  : denies  dysuria, urinary incontinence, hematuria.  Heme/oncology: denies history of easy bruising or bleeding. No history of cancer, DVTor PE.  Allergy/immunology: denies hives/urticaria, or itching.   Dermatologic: denies new rash, or new skin lesions.  Musculoskeletal:denies joint swelling or pain, muscle pain, neck and back pain.   Neurologic: As noted in detail above; otherwise non contributory.   Psychiatric: denies symptoms of depression, anxiety, hallucinations, mood swings or changes, suicidal or homicidal thoughts.     Physical examination:   Vitals:    07/29/22 2103 07/30/22 0000 07/30/22 0400 07/30/22 0706   BP:  111/60 115/67 108/58   Pulse: 91 73 70 76   Resp:  18 20 18   Temp:   36.7 °C (98.1 °F) 36.7 °C (98.1 °F)   TempSrc:  Temporal Temporal Temporal   SpO2:  90% 93% 96%   Weight:       Height:         General: Patient in no acute distress, pleasant and cooperative.  HEENT: Normocephalic, no signs of acute trauma.   Neck: supple, no meningeal signs or carotid bruits. There is normal range of motion. No tenderness on exam.   Chest: clear to auscultation. No cough.   CV: RRR, no murmurs.   Skin: no signs of acute rashes or trauma.   Musculoskeletal: joints exhibit full range of motion, without any pain to palpation. There are no signs of joint or muscle swelling. There is no tenderness to deep palpation of muscles.   Psychiatric: No hallucinatory behavior.       NEUROLOGICAL EXAM:   Mental status, orientation: Awake, alert and fully oriented; disoriented to situation at times.   Speech and language: speech is clear and fluent. The patient is able to name, repeat and comprehend.   Memory: There is intact recollection of recent and remote events.   Cranial nerve exam: Pupils are 3-4 mm bilaterally and equally reactive to light. Visual fields are intact by confrontation. There is no nystagmus on primary or secondary gaze. Intact full EOM in all directions of gaze. Face appears symmetric. Sensation in the face is  intact to light touch. Uvula is midline. Palate elevates symmetrically. Tongue is midline and without any signs of tongue biting or fasciculations. Shoulder shrug is intact bilaterally.   Motor exam: Strength is 5/5 in all extremities; no drift. Tone is normal. No abnormal movements were seen on exam.   Sensory exam reveals normal sense of light touch and pinprick in all extremities.   Deep tendon reflexes:  Plantar responses are flexor. There is no clonus.   Coordination: shows a normal finger-nose-finger.   Gait: Not assessed at this time as patient is a fall risk.       NIH Stroke Scale    1a Level of Consciousness   1b Orientation Questions 1  1c Response to Commands   2 Gaze   3 Visual Fields 1  4 Facial Movement   5 Motor Function (arm)   a Left   b Right   6 Motor Function (leg)   a Left   b Right   7 Limb Ataxia   8 Sensory   9 Language   10 Articulation   11 Extinction/Inattention     Score:  2      ANCILLARY DATA REVIEWED:     Lab Data Review:  Recent Results (from the past 24 hour(s))   POCT glucose device results    Collection Time: 07/29/22 12:43 PM   Result Value Ref Range    POC Glucose, Blood 270 (H) 65 - 99 mg/dL   POCT glucose device results    Collection Time: 07/29/22  5:22 PM   Result Value Ref Range    POC Glucose, Blood 161 (H) 65 - 99 mg/dL   POCT glucose device results    Collection Time: 07/29/22  8:15 PM   Result Value Ref Range    POC Glucose, Blood 200 (H) 65 - 99 mg/dL   CBC WITHOUT DIFFERENTIAL    Collection Time: 07/30/22  2:42 AM   Result Value Ref Range    WBC 11.9 (H) 4.8 - 10.8 K/uL    RBC 5.30 4.70 - 6.10 M/uL    Hemoglobin 15.3 14.0 - 18.0 g/dL    Hematocrit 46.0 42.0 - 52.0 %    MCV 86.8 81.4 - 97.8 fL    MCH 28.9 27.0 - 33.0 pg    MCHC 33.3 (L) 33.7 - 35.3 g/dL    RDW 40.0 35.9 - 50.0 fL    Platelet Count 307 164 - 446 K/uL    MPV 9.7 9.0 - 12.9 fL   Comp Metabolic Panel    Collection Time: 07/30/22  2:42 AM   Result Value Ref Range    Sodium 138 135 - 145 mmol/L    Potassium  3.9 3.6 - 5.5 mmol/L    Chloride 104 96 - 112 mmol/L    Co2 23 20 - 33 mmol/L    Anion Gap 11.0 7.0 - 16.0    Glucose 171 (H) 65 - 99 mg/dL    Bun 14 8 - 22 mg/dL    Creatinine 0.74 0.50 - 1.40 mg/dL    Calcium 8.7 8.5 - 10.5 mg/dL    AST(SGOT) 20 12 - 45 U/L    ALT(SGPT) 18 2 - 50 U/L    Alkaline Phosphatase 94 30 - 99 U/L    Total Bilirubin 0.4 0.1 - 1.5 mg/dL    Albumin 3.5 3.2 - 4.9 g/dL    Total Protein 6.1 6.0 - 8.2 g/dL    Globulin 2.6 1.9 - 3.5 g/dL    A-G Ratio 1.3 g/dL   ESTIMATED GFR    Collection Time: 07/30/22  2:42 AM   Result Value Ref Range    GFR (CKD-EPI) 106 >60 mL/min/1.73 m 2   POCT glucose device results    Collection Time: 07/30/22  6:31 AM   Result Value Ref Range    POC Glucose, Blood 181 (H) 65 - 99 mg/dL       Labs reviewed by me.       Imaging reviewed by me:     CT-HEAD W/O   Final Result      1. Stable left parietal cortical infarction with linear area of increased attenuation centrally that could represent a small amount of hemorrhage. No progression in the interval.   2. The additional punctate areas of left cerebral cortical infarction seen on the prior brain MRI are not discernible on the current exam.   3. Diffuse chronic microangiopathic white matter changes.         EC-ECHOCARDIOGRAM COMPLETE W/O CONT   Final Result      MR-BRAIN-W/O   Final Result      1.  Well-circumscribed 24 mm x 17 mm area of acute infarction in the left parietal lobe subcortical and deep white matter. Question of minimal hemorrhagic transformation with a thin curvilinear area of gradient echo hypointensity.   2.  Additional multifocal subcentimeter and punctate areas of acute infarction involving the left anterior frontal lobe, left frontal convexity, and left occipital cortex. There appears to be carotid origin of the posterior cerebral arteries and these    lesions may all be within the territory of the left internal carotid artery. Nonetheless, an embolic mechanism would be considered.   3.  Minimal  supratentorial white matter disease elsewhere in the cerebral hemispheres most consistent with microvascular ischemic change.      CT-CTA HEAD WITH & W/O-POST PROCESS   Final Result         1.  No large vessel occlusion or aneurysm identified.   2.  Area of low-density in the left parietal lobe near the vertex, appearance suggests area of prior infarct. Follow-up MRI of the brain with contrast to definitively exclude intracranial lesion due to relative discrete appearance.      CT-CTA NECK WITH & W/O-POST PROCESSING   Final Result         1.  Slight narrowing of the distal left internal carotid artery near the bony carotid canal, could represent underlying soft plaque or vascular spasm.         DX-CHEST-PORTABLE (1 VIEW)   Final Result      Mild left basilar atelectasis            Presumed mechanism by TOAST:  __Large Artery Atherosclerosis  __Small Vessel (Lacunar)  __Cardioembolic  __Other (Sickle Cell, Vasculitis, Hypercoagulable)  _X_Unknown    Modified [place] Scale (MRS): 0 = No symptoms      ASSESSMENT AND PLAN:  57-year old male with PMHX significant for tobacco abuse, hypertension who presented to [facility] on 7/27/22 for a chief complaint of vision changes, vertigo found to have embolic appearing L frontoparietal infarcts. CT angiogram without flow limiting stenosis, evidence of underlying atherosclerotic disease. Started on brief course of DAPT with plan for monotherpay (ASA only thereafter). On 7/29 morning, the patient had a witnessed seizure like event characterized by convulsions, generalizing to full body tonic-clonic seizure lasting 1-2 minutes. EEG normal study; no additional seizure like events reported. Patient started on Keppra 1000 mg PO BID (may plan to wean as outpatient); seizure likely related to acute stroke (though uncommon, a small percentage of patient with ischemic stroke do present with/have complicating seizures).     Additional Recommendations/Plan:     -q4h and PRN neuro  assessment. VS per nursing/unit protocol. Bp goal is nomotension. 100-130/60-80. Antihypertensives per primary team.   -Telemetry; currently SR. Screen for Afib/arrhythmia. Note TTE with normal EF; no gross structural abnormalities.   -ASA 81 mg PO q day and Plavix 75 mg PO q day x 21 days; then monotherapy with ASA indefinitely.   -Atorvastatin 80 mg PO q HS and Fenofibrate  q day; note profoundly elevated triglycerides thus uncalculable LDL.   -Recommend aggressive BG management per primary team. Avoid IVF with Dextrose. BG goal 140-180. hemoglobin A1c is 12.4; further management for type II diabetes per hospitalist.   -Continue Keppra 1000 mg PO BID. Wean as outpatient.   -PT/OT/SLP eval and treat.   -Counseled patient at length regarding life style and risk factor modification for secondary stroke prevention; including importance of smoking cessation. Patient expresses desire to try Rx Chantix for smoking cessation.    -All other medical management per primary team.   -DVT PPX: SCDs.      The plan of care above has been discussed with Dr. Bella. Other than the above, no further recommendations from a neurological perspective; patient is clear for discharge. Please call with questions.     SIENNA [email or web address].  [facility]

## 2022-07-30 NOTE — DISCHARGE SUMMARY
Discharge Summary    CHIEF COMPLAINT ON ADMISSION  Chief Complaint   Patient presents with   • Diplopia   • Loss Of Balance   • Headache       Reason for Admission  Blurred Vision, Headache     Admission Date  7/27/2022    CODE STATUS  FULL    HPI & HOSPITAL COURSE    57-year-old with a history of uncontrolled diabetes and hypertension presented with changes in vision, loss of balance and headache.  CTA head and neck revealed subacute infarct of the left parietal lobe and soft atherosclerotic plaque of distal left ICA without flow-limiting stenosis or occlusion amenable to endovascular clot retrieval.  He was not a candidate for tPA.  MRI brain without contrast showed subacute left parietal infarct as well as multifocal punctate infarcts of the left anterior frontal lobe, left frontal convexity, and left occipital cortex in the border zone territory.  Etiology of the subacute infarcts likely large vessel atherosclerosis with artery to artery arterial emboli.  Hospital course complicated by seizures (focal then became generalized).  EEG however was negative.  He was loaded with Keppra and started on Keppra 1 g twice daily.  Echo normal.  Zio patch will need to be placed at discharge.  He was started on Plavix 75 mg p.o. daily x10 days and aspirin 81 mg p.o. daily followed by monotherapy with aspirin only.  He was started on atorvastatin 80 mg nightly as well as fenofibrate.  He was found to have an A1c of 12.4.  He was started on insulin therapy and metformin.  DMV form filled out.  On day of discharge he was back to baseline. Sugars controlled.       Therefore, he is discharged in good and stable condition to home with close outpatient follow-up.    The patient met 2-midnight criteria for an inpatient stay at the time of discharge.    Discharge Date  7/30/2022    FOLLOW UP ITEMS POST DISCHARGE  Neurology stroke/epilepsy followup  PCP referral placed   [facility] follow up    DISCHARGE DIAGNOSES  Principal Problem:    CVA  (cerebral vascular accident) (HCC) POA: Unknown  Active Problems:    Uncontrolled diabetes mellitus with hyperglycemia (HCC) POA: Unknown    Elevated troponin POA: Unknown    Acute CVA (cerebrovascular accident) (HCC) POA: Yes    Tobacco abuse POA: Unknown    Recurrent cerebrovascular accidents (CVAs) (HCC) POA: Yes    Seizure (HCC) POA: Unknown    Hypertriglyceridemia POA: Unknown  Resolved Problems:    * No resolved hospital problems. *      FOLLOW UP  No future appointments.  [facility] NEUROLOGY  [address]  [address]  [facility] [address]  [phone]          [facility] - Neurology  [address] [facility] [place], [address]  [facility] [address]  [phone]  Schedule an appointment as soon as possible for a visit  [facility]      MEDICATIONS ON DISCHARGE     Medication List      START taking these medications      Instructions   Alcohol Swabs   Doctor's comments: Per formulary preference. ICD-10 code: E11.65 Uncontrolled type 2 Diabetes Mellitus  Wipe site with prep pad prior to injection.     aspirin 81 MG Chew chewable tablet  Start taking on: July 31, 2022  Commonly known as: ASA   Chew 1 Tablet by mouth every day.  Dose: 81 mg     atorvastatin 80 MG tablet  Commonly known as: LIPITOR   Take 1 Tablet by mouth every evening.  Dose: 80 mg     clopidogrel 75 MG Tabs  Start taking on: July 31, 2022  Commonly known as: PLAVIX   Take 1 Tablet by mouth every day.  Dose: 75 mg     fenofibrate micronized 67 MG capsule  Start taking on: July 31, 2022  Commonly known as: LOFIBRA   Take 1 Capsule by mouth every day.  Dose: 67 mg     insulin lispro 100 UNIT/ML Sopn injection PEN  Commonly known as: HumaLOG,AdmeLOG   Inject 0-6 Units under the skin 3 times a day before meals.  Dose: 0-6 Units     Lantus SoloStar 100 UNIT/ML Sopn injection  Generic drug: insulin glargine   Inject 10 Units under the skin every evening.  Dose: 10 Units     levetiracetam 1000 MG tablet  Commonly known as: KEPPRA   Take  1 Tablet by mouth 2 times a day.  Dose: 1,000 mg     lisinopril 10 MG Tabs  Start taking on: July 31, 2022  Commonly known as: PRINIVIL   Take 1 Tablet by mouth every day.  Dose: 10 mg     metFORMIN 500 MG Tabs  Commonly known as: GLUCOPHAGE   Take 1 Tablet by mouth 2 times a day with meals.  Dose: 500 mg     TechLite Lancets Misc   Doctor's comments: Or per formulary preference. ICD-10 code: E11.65 Uncontrolled type 2 Diabetes Mellitus  Use to test 3 times daily before meals     True Metrix Blood Glucose Test strip  Generic drug: glucose blood   Doctor's comments: Or per formulary preference. ICD-10 code: E11.65 Uncontrolled type 2 Diabetes Mellitus  Use one 1 strip to test blood sugar three times daily before meals.     True Metrix Meter w/Device Kit   Doctor's comments: Or per formulary preference. ICD-10 code: E11.65 Uncontrolled type 2 Diabetes Mellitus  Test blood sugar as recommended by provider. 1 blood glucose monitoring kit.     UltiCare Micro Pen Needles  Generic drug: Insulin Pen Needle 32 G x 4 mm   Doctor's comments: Per patient/formulary preference. ICD-10 code: E11.65 Uncontrolled type 2 Diabetes Mellitus  Use one pen needle in pen device to inject insulin four times daily.        CONTINUE taking these medications      Instructions   Saw Palmetto 500 MG Caps   Take 1 Capsule by mouth every day.  Dose: 1 Capsule        STOP taking these medications    metoprolol SR 25 MG Tb24  Commonly known as: TOPROL XL            Allergies  No Known Allergies    DIET  No orders of the defined types were placed in this encounter.      ACTIVITY  As tolerated.  Weight bearing as tolerated    CONSULTATIONS  Neurology    PROCEDURES  EEG 9/29    Discharge exam:  Physical Exam  Constitutional:       General: He is not in acute distress.     Appearance: He is not ill-appearing, toxic-appearing or diaphoretic.   HENT:      Head: Normocephalic and atraumatic.      Nose: Nose normal.      Mouth/Throat:      Mouth: Mucous  membranes are moist.      Pharynx: Oropharynx is clear.   Eyes:      General: No scleral icterus.     Extraocular Movements: Extraocular movements intact.      Conjunctiva/sclera: Conjunctivae normal.      Pupils: Pupils are equal, round, and reactive to light.   Cardiovascular:      Rate and Rhythm: Normal rate and regular rhythm.      Heart sounds: No murmur heard.    No friction rub. No gallop.   Pulmonary:      Effort: Pulmonary effort is normal.      Breath sounds: Normal breath sounds.   Abdominal:      General: Bowel sounds are normal. There is no distension.      Palpations: Abdomen is soft.      Tenderness: There is no abdominal tenderness.   Musculoskeletal:      Cervical back: Normal range of motion.      Right lower leg: No edema.      Left lower leg: No edema.   Skin:     Coloration: Skin is not jaundiced.      Findings: No rash.   Neurological:      Mental Status: He is alert and oriented to person, place, and time. Mental status is at baseline.      Cranial Nerves: No cranial nerve deficit.      Sensory: No sensory deficit.      Motor: No weakness.      Gait: Gait normal.   Psychiatric:         Mood and Affect: Mood normal.         Behavior: Behavior normal.           LABORATORY  Lab Results   Component Value Date    SODIUM 138 07/30/2022    POTASSIUM 3.9 07/30/2022    CHLORIDE 104 07/30/2022    CO2 23 07/30/2022    GLUCOSE 171 (H) 07/30/2022    BUN 14 07/30/2022    CREATININE 0.74 07/30/2022        Lab Results   Component Value Date    WBC 11.9 (H) 07/30/2022    HEMOGLOBIN 15.3 07/30/2022    HEMATOCRIT 46.0 07/30/2022    PLATELETCT 307 07/30/2022      EC-ECHOCARDIOGRAM COMPLETE W/O CONT  Transthoracic  Echo Report    Echocardiography Laboratory    CONCLUSIONS  No prior study is available for comparison.   Normal left ventricular chamber size.  The left ventricular ejection fraction is visually estimated to be 65%.  Normal diastolic function.  Normal inferior vena cava size and inspiratory  MORRIS Vila  Exam Date:         2022                      16:59  Exam Location:     Inpatient  Priority:          Routine    Ordering Physician:        SHY GOTTI  Referring Physician:       [id number] Mercer  Sonographer:               Daysi ROJAS    Age:    57     Gender:    M  MRN:    [id number]  :    1965  BSA:    2.42   Ht (in):    76     Wt (lb):    245  Exam Type:     Complete    Indications:     CVA  ICD Codes:       436    CPT Codes:       89640    BP:   155    /   81     HR:   74  Technical Quality:       Fair    MEASUREMENTS  (Male / Female) Normal Values  2D ECHO  LV Diastolic Diameter PLAX        5.4 cm                4.2 - 5.9 / 3.9 - 5.3   cm  LV Systolic Diameter PLAX         3.6 cm                2.1 - 4.0 cm  IVS Diastolic Thickness           0.79 cm                 LVPW Diastolic Thickness          0.78 cm                 LVOT Diameter                     2.1 cm                  Estimated LV Ejection Fraction    65 %                    LV Ejection Fraction MOD BP       70.1 %                >= 55  %  LV Ejection Fraction MOD 4C       68.1 %                  LV Ejection Fraction MOD 2C       73.4 %                  IVC Diameter                      1.2 cm                    DOPPLER  AV Peak Velocity                  1 m/s                   AV Peak Gradient                  4.4 mmHg                AV Mean Gradient                  2.6 mmHg                LVOT Peak Velocity                0.99 m/s                AV Area Cont Eq vti               3.1 cm2                 Mitral E Point Velocity           0.56 m/s                Mitral E to A Ratio               0.92                    MV Pressure Half Time             42.9 ms                 MV Area PHT                       5.1 cm2                 MV Deceleration Time              148 ms                    * Indicates values subject to auto-interpretation  LV EF:  65    %    FINDINGS  Left Ventricle  Normal left  ventricular chamber size. Normal left ventricular wall   thickness. Normal left ventricular systolic function. The left   ventricular ejection fraction is visually estimated to be 65%. Normal   regional wall motion. Normal diastolic function.    Right Ventricle  Normal right ventricular size and systolic function.    Right Atrium  Normal inferior vena cava size and inspiratory collapse.    Left Atrium  Normal left atrial size. Left atrial volume index is 17.81 mL/sq m.    Mitral Valve  Structurally normal mitral valve. No mitral stenosis. Trace mitral   regurgitation.    Aortic Valve  Structurally normal aortic valve without significant stenosis or   regurgitation. Tricuspid aortic valve.    Tricuspid Valve  Structurally normal tricuspid valve without significant stenosis or   regurgitation. Right atrial pressure is estimated to be 3 mmHg.    Pulmonic Valve  Structurally normal pulmonic valve without significant stenosis or   regurgitation.    Pericardium  Normal pericardium without effusion.    Aorta  Normal aortic root for body surface area. The ascending aorta diameter   is 3.7 cm.    Dilan Werner MD  (Electronically Signed)  Final Date:     29 July 2022                   09:46  CT-HEAD W/O  Narrative: 7/29/2022 8:13 AM    HISTORY/REASON FOR EXAM:  Neuro deficit, acute, stroke suspected.    TECHNIQUE/EXAM DESCRIPTION AND NUMBER OF VIEWS:  CT of the head without contrast.    The study was performed on a helical multidetector CT scanner. Contiguous axial sections were obtained from the skull base through the vertex.    Up to date radiation dose reduction adjustments have been utilized to meet ALARA standards for radiation dose reduction.    COMPARISON:  MRI brain, 7/28/2022.    FINDINGS:  Stable location and size of the focal area of cortical infarction in the left parietal lobe. Continued linear area of increased attenuation within the central portion of the cortical infarct, that could represent a small  amount of hemorrhage. No   progression in the interval. Diffuse chronic microangiopathic white matter changes are evident. The punctate areas of cortical infarction seen on the prior brain MRI in the left frontal, left parietal and left occipital cortex are not discernible on the   current exam.    No new hemorrhage in the interval. No shift in midline structures. The ventricular system is not dilated.    Paranasal sinuses in the field of view are unremarkable.    Middle ear cavities and mastoid air cells are clear. Orbital structures are symmetric and within normal limits.  Impression: 1. Stable left parietal cortical infarction with linear area of increased attenuation centrally that could represent a small amount of hemorrhage. No progression in the interval.  2. The additional punctate areas of left cerebral cortical infarction seen on the prior brain MRI are not discernible on the current exam.  3. Diffuse chronic microangiopathic white matter changes.        MR-BRAIN-W/O   Final Result       1.  Well-circumscribed 24 mm x 17 mm area of acute infarction in the left parietal lobe subcortical and deep white matter. Question of minimal hemorrhagic transformation with a thin curvilinear area of gradient echo hypointensity.   2.  Additional multifocal subcentimeter and punctate areas of acute infarction involving the left anterior frontal lobe, left frontal convexity, and left occipital cortex. There appears to be carotid origin of the posterior cerebral arteries and these    lesions may all be within the territory of the left internal carotid artery. Nonetheless, an embolic mechanism would be considered.   3.  Minimal supratentorial white matter disease elsewhere in the cerebral hemispheres most consistent with microvascular ischemic change.       CT-CTA HEAD WITH & W/O-POST PROCESS   Final Result           1.  No large vessel occlusion or aneurysm identified.   2.  Area of low-density in the left parietal lobe  near the vertex, appearance suggests area of prior infarct. Follow-up MRI of the brain with contrast to definitively exclude intracranial lesion due to relative discrete appearance.       CT-CTA NECK WITH & W/O-POST PROCESSING   Final Result           1.  Slight narrowing of the distal left internal carotid artery near the bony carotid canal, could represent underlying soft plaque or vascular spasm.           DX-CHEST-PORTABLE (1 VIEW)   Final Result       Mild left basilar atelectasis         Total time of the discharge process exceeds 35 minutes.

## 2022-07-30 NOTE — PROGRESS NOTES
Monitor summary: SR , [place] 0.15, QRS 0.09, QT 0.35, with rare PVCs per strip from monitor room.

## 2022-07-30 NOTE — PROGRESS NOTES
Monitor Summary: SR 70-87, [place] .17, QRS .09, QT .34 with rare PVC per strip from monitor room

## 2022-07-30 NOTE — DISCHARGE INSTRUCTIONS
Discharge Instructions per Xin Issa M.D.    Mr. Valenzuela,    You were admitted to [facility] and found to have a stroke and then had seizures.  You were also found to have diabetes.  You have been started on a few new medications:    Medications for stroke prevention: aspirin, plavix, atorvastatin, fenofibrate    Medication to prevent seizures: keppra    Blood pressure medication: lisinopril    Diabetes medications:  Lantus 10 units nightly  Lispro 0-6 units three times daily with meals according to correction scale as below:  For glucose:  70   - 150  mg/dL =      0 Units  151 - 200  mg/dL =     1 Units  201 - 250  mg/dL =    2 Units  251 - 300  mg/dL  =   3 Units  301 - 350  mg/dL  =   4 Units  351 - 400 mg/dL   =   5 Units  Over 400 mg/dL   =   6 Units  Over 400 mg/dL x 2 consecutive FSBG = 6 Units and call MD      DIET: diabetic diet    ACTIVITY: normal    Return to ER if you develop new neurologic symptoms, chest pain, shortness of breath, seizures, bleeding or any other concerning symptoms.           Discharge Instructions    Discharged to home by car with relative. Discharged via walking, hospital escort: Refused.  Special equipment needed: Not Applicable    Be sure to schedule a follow-up appointment with your primary care doctor or any specialists as instructed.     Discharge Plan:   Diet Plan: Discussed  Activity Level: Discussed  Confirmed Follow up Appointment: Patient to Call and Schedule Appointment  Confirmed Symptoms Management: Discussed  Medication Reconciliation Updated: Yes    I understand that a diet low in cholesterol, fat, and sodium is recommended for good health. Unless I have been given specific instructions below for another diet, I accept this instruction as my diet prescription.   Other diet: Low fat, low sodium, heart healthy diet.    Special Instructions:     Stroke/CVA/TIA/Hemorrhagic Ischemia Discharge Instructions  You have had a stroke. Your risk factors have been  identified as follows:  Age - Over 55  Gender - Men are at a higher risk than women  High blood pressure  Diabetes  Overweight  It is important that you reduce your risk factors to avoid another stroke in the future. Here are some general guidelines to follow:  Eat healthy - avoid food high in fat.  Get regular exercise.  Maintain a healthy weight.  Avoid smoking.  Avoid alcohol and illegal drug use.  Take your medications as directed.  For more information regarding risk factors, refer to pages 17-19 in your Stroke Patient Education Guide. Stroke Education Guide was given to patient and family member.    Warning signs of a stroke include (which can also be found on page 3 of your Stroke Patient Education Guide):  Sudden numbness of weakness of the face, arm or leg (especially on one side of the body).  Sudden confusion, trouble speaking or understanding.  Sudden trouble seeing in one or both eyes.  Sudden trouble walking, dizziness, loss of balance or coordination.  Sudden severe headache with no known cause.  It is very important to get treatment quickly when a stroke occurs. If you experience any of the above warning signs, call [phone] immediately.     Some patients who have had a stroke will be going home on a blood thinner medication called Warfarin (Coumadin).  This medication requires very close monitoring and follow up.  This follow up can be provided by either your Primary Care Physician or by [facility]'s Outpatient Anticoagulation Service.  The Outpatient Anticoagulation Service is located at the [facility] at [facility] ([facility]).  If you do not know when your follow up appointment is scheduled, call [phone] to verify your appointment time.    -Is this patient being discharged with medication to prevent blood clots?  Yes, Aspirin   Aspirin, ASA oral tablets  What is this medicine?  ASPIRIN (AS pir in) is a pain reliever. It is used to treat mild  pain and fever. This medicine is also used as directed by a doctor to prevent and to treat heart attacks, to prevent strokes and blood clots, and to treat arthritis or inflammation.  This medicine may be used for other purposes; ask your health care provider or pharmacist if you have questions.  COMMON BRAND NAME(S): Aspir-Low, Aspir-Delia, Aspirtab, Gabriel Advanced Aspirin, Gabriel Aspirin, Gabriel Aspirin Extra Strength, Gabriel Aspirin Plus, Gabriel Extra Strength, Gabriel Extra Strength Plus, Gabriel Genuine Aspirin, Gabriel Womens Aspirin, Bufferin, Bufferin Extra Strength, Bufferin Low Dose  What should I tell my health care provider before I take this medicine?  They need to know if you have any of these conditions:  anemia  asthma  bleeding problems  child with chickenpox, the flu, or other viral infection  diabetes  gout  if you frequently drink alcohol containing drinks  kidney disease  liver disease  low level of vitamin K  lupus  smoke tobacco  stomach ulcers or other problems  an unusual or allergic reaction to aspirin, tartrazine dye, other medicines, dyes, or preservatives  pregnant or trying to get pregnant  breast-feeding  How should I use this medicine?  Take this medicine by mouth with a glass of water. Follow the directions on the package or prescription label. You can take this medicine with or without food. If it upsets your stomach, take it with food. Do not take your medicine more often than directed.  Talk to your pediatrician regarding the use of this medicine in children. While this drug may be prescribed for children as young as 12 years of age for selected conditions, precautions do apply. Children and teenagers should not use this medicine to treat chicken pox or flu symptoms unless directed by a doctor.  Patients over 65 years old may have a stronger reaction and need a smaller dose.  Overdosage: If you think you have taken too much of this medicine contact a poison control center or emergency room at  once.  NOTE: This medicine is only for you. Do not share this medicine with others.  What if I miss a dose?  If you are taking this medicine on a regular schedule and miss a dose, take it as soon as you can. If it is almost time for your next dose, take only that dose. Do not take double or extra doses.  What may interact with this medicine?  Do not take this medicine with any of the following medications:  cidofovir  ketorolac  probenecid  This medicine may also interact with the following medications:  alcohol  alendronate  bismuth subsalicylate  flavocoxid  herbal supplements like feverfew, garlic, elgin, ginkgo biloba, horse chestnut  medicines for diabetes or glaucoma like acetazolamide, methazolamide  medicines for gout  medicines that treat or prevent blood clots like enoxaparin, heparin, ticlopidine, warfarin  other aspirin and aspirin-like medicines  NSAIDs, medicines for pain and inflammation, like ibuprofen or naproxen  pemetrexed  sulfinpyrazone  varicella live vaccine  This list may not describe all possible interactions. Give your health care provider a list of all the medicines, herbs, non-prescription drugs, or dietary supplements you use. Also tell them if you smoke, drink alcohol, or use illegal drugs. Some items may interact with your medicine.  What should I watch for while using this medicine?  If you are treating yourself for pain, tell your doctor or health care professional if the pain lasts more than 10 days, if it gets worse, or if there is a new or different kind of pain. Tell your doctor if you see redness or swelling. Also, check with your doctor if you have a fever that lasts for more than 3 days. Only take this medicine to prevent heart attacks or blood clotting if prescribed by your doctor or health care professional.  Do not take aspirin or aspirin-like medicines with this medicine. Too much aspirin can be dangerous. Always read the labels carefully.  This medicine can irritate your  stomach or cause bleeding problems. Do not smoke cigarettes or drink alcohol while taking this medicine. Do not lie down for 30 minutes after taking this medicine to prevent irritation to your throat.  If you are scheduled for any medical or dental procedure, tell your healthcare provider that you are taking this medicine. You may need to stop taking this medicine before the procedure.  This medicine may be used to treat migraines. If you take migraine medicines for 10 or more days a month, your migraines may get worse. Keep a diary of headache days and medicine use. Contact your healthcare professional if your migraine attacks occur more frequently.  What side effects may I notice from receiving this medicine?  Side effects that you should report to your doctor or health care professional as soon as possible:  allergic reactions like skin rash, itching or hives, swelling of the face, lips, or tongue  breathing problems  changes in hearing, ringing in the ears  confusion  general ill feeling or flu-like symptoms  pain on swallowing  redness, blistering, peeling or loosening of the skin, including inside the mouth or nose  signs and symptoms of bleeding such as bloody or black, tarry stools; red or dark-brown urine; spitting up blood or brown material that looks like coffee grounds; red spots on the skin; unusual bruising or bleeding from the eye, gums, or nose  trouble passing urine or change in the amount of urine  unusually weak or tired  yellowing of the eyes or skin  Side effects that usually do not require medical attention (report to your doctor or health care professional if they continue or are bothersome):  diarrhea or constipation  headache  nausea, vomiting  stomach gas, heartburn  This list may not describe all possible side effects. Call your doctor for medical advice about side effects. You may report side effects to FDA at 2-978-FDA-0332.  Where should I keep my medicine?  Keep out of the reach of  children.  Store at room temperature between 15 and 30 degrees C (59 and 86 degrees F). Protect from heat and moisture. Do not use this medicine if it has a strong vinegar smell. Throw away any unused medicine after the expiration date.  NOTE: This sheet is a summary. It may not cover all possible information. If you have questions about this medicine, talk to your doctor, pharmacist, or health care provider.  © 2020 [employer] (2018-01-30 10:42:13)    Is patient discharged on Warfarin / Coumadin?   No     Blood Glucose Monitoring, Adult  Monitoring your blood sugar (glucose) is an important part of managing your diabetes (diabetes mellitus). Blood glucose monitoring involves checking your blood glucose as often as directed and keeping a record (log) of your results over time.  Checking your blood glucose regularly and keeping a blood glucose log can:  Help you and your health care provider adjust your diabetes management plan as needed, including your medicines or insulin.  Help you understand how food, exercise, illnesses, and medicines affect your blood glucose.  Let you know what your blood glucose is at any time. You can quickly find out if you have low blood glucose (hypoglycemia) or high blood glucose (hyperglycemia).  Your health care provider will set individualized treatment goals for you. Your goals will be based on your age, other medical conditions you have, and how you respond to diabetes treatment. Generally, the goal of treatment is to maintain the following blood glucose levels:  Before meals (preprandial):  mg/dL (4.4-7.2 mmol/L).  After meals (postprandial): below 180 mg/dL (10 mmol/L).  A1c level: less than 7%.  Supplies needed:  Blood glucose meter.  Test strips for your meter. Each meter has its own strips. You must use the strips that came with your meter.  A needle to prick your finger (lancet). Do not use a lancet more than one time.  A device that holds the lancet (lancing  device).  A journal or log book to write down your results.  How to check your blood glucose    Wash your hands with soap and water.  Prick the side of your finger (not the tip) with the lancet. Use a different finger each time.  Gently rub the finger until a small drop of blood appears.  Follow instructions that come with your meter for inserting the test strip, applying blood to the strip, and using your blood glucose meter.  Write down your result and any notes.  Some meters allow you to use areas of your body other than your finger (alternative sites) to test your blood. The most common alternative sites are:  Forearm.  Thigh.  Palm of the hand.  If you think you may have hypoglycemia, or if you have a history of not knowing when your blood glucose is getting low (hypoglycemia unawareness), do not use alternative sites. Use your finger instead. Alternative sites may not be as accurate as the fingers, because blood flow is slower in these areas. This means that the result you get may be delayed, and it may be different from the result that you would get from your finger.  Follow these instructions at home:  Blood glucose log    Every time you check your blood glucose, write down your result. Also write down any notes about things that may be affecting your blood glucose, such as your diet and exercise for the day. This information can help you and your health care provider:  Look for patterns in your blood glucose over time.  Adjust your diabetes management plan as needed.  Check if your meter allows you to download your records to a computer. Most glucose meters store a record of glucose readings in the meter.  If you have type 1 diabetes:  Check your blood glucose 2 or more times a day.  Also check your blood glucose:  Before every insulin injection.  Before and after exercise.  Before meals.  2 hours after a meal.  Occasionally between 2:00 a.m. and 3:00 a.m., as directed.  Before potentially dangerous tasks,  "like driving or using heavy machinery.  At bedtime.  You may need to check your blood glucose more often, up to 6-10 times a day, if you:  Use an insulin pump.  Need multiple daily injections (MDI).  Have diabetes that is not well-controlled.  Are ill.  Have a history of severe hypoglycemia.  Have hypoglycemia unawareness.  If you have type 2 diabetes:  If you take insulin or other diabetes medicines, check your blood glucose 2 or more times a day.  If you are on intensive insulin therapy, check your blood glucose 4 or more times a day. Occasionally, you may also need to check between 2:00 a.m. and 3:00 a.m., as directed.  Also check your blood glucose:  Before and after exercise.  Before potentially dangerous tasks, like driving or using heavy machinery.  You may need to check your blood glucose more often if:  Your medicine is being adjusted.  Your diabetes is not well-controlled.  You are ill.  General tips  Always keep your supplies with you.  If you have questions or need help, all blood glucose meters have a 24-hour \"hotline\" phone number that you can call. You may also contact your health care provider.  After you use a few boxes of test strips, adjust (calibrate) your blood glucose meter by following instructions that came with your meter.  Contact a health care provider if:  Your blood glucose is at or above 240 mg/dL (13.3 mmol/L) for 2 days in a row.  You have been sick or have had a fever for 2 days or longer, and you are not getting better.  You have any of the following problems for more than 6 hours:  You cannot eat or drink.  You have nausea or vomiting.  You have diarrhea.  Get help right away if:  Your blood glucose is lower than 54 mg/dL (3 mmol/L).  You become confused or you have trouble thinking clearly.  You have difficulty breathing.  You have moderate or large ketone levels in your urine.  Summary  Monitoring your blood sugar (glucose) is an important part of managing your diabetes (diabetes " mellitus).  Blood glucose monitoring involves checking your blood glucose as often as directed and keeping a record (log) of your results over time.  Your health care provider will set individualized treatment goals for you. Your goals will be based on your age, other medical conditions you have, and how you respond to diabetes treatment.  Every time you check your blood glucose, write down your result. Also write down any notes about things that may be affecting your blood glucose, such as your diet and exercise for the day.  This information is not intended to replace advice given to you by your health care provider. Make sure you discuss any questions you have with your health care provider.  Document Released: 12/20/2004 Document Revised: 10/11/2019 Document Reviewed: 05/29/2017  [employer] Patient Education © 2020 [employer]    Hypoglycemia  Hypoglycemia occurs when the level of sugar (glucose) in the blood is too low. Hypoglycemia can happen in people who do or do not have diabetes. It can develop quickly, and it can be a medical emergency. For most people with diabetes, a blood glucose level below 70 mg/dL (3.9 mmol/L) is considered hypoglycemia.  Glucose is a type of sugar that provides the body's main source of energy. Certain hormones (insulin and glucagon) control the level of glucose in the blood. Insulin lowers blood glucose, and glucagon raises blood glucose. Hypoglycemia can result from having too much insulin in the bloodstream, or from not eating enough food that contains glucose. You may also have reactive hypoglycemia, which happens within 4 hours after eating a meal.  What are the causes?  Hypoglycemia occurs most often in people who have diabetes and may be caused by:  Diabetes medicine.  Not eating enough, or not eating often enough.  Increased physical activity.  Drinking alcohol on an empty stomach.  If you do not have diabetes, hypoglycemia may be caused by:  A tumor in the pancreas.  Not  eating enough, or not eating for long periods at a time (fasting).  A severe infection or illness.  Certain medicines.  What increases the risk?  Hypoglycemia is more likely to develop in:  People who have diabetes and take medicines to lower blood glucose.  People who abuse alcohol.  People who have a severe illness.  What are the signs or symptoms?  Mild symptoms  Mild hypoglycemia may not cause any symptoms. If you do have symptoms, they may include:  Hunger.  Anxiety.  Sweating and feeling clammy.  Dizziness or feeling light-headed.  Sleepiness.  Nausea.  Increased heart rate.  Headache.  Blurry vision.  Irritability.  Tingling or numbness around the mouth, lips, or tongue.  A change in coordination.  Restless sleep.  Moderate symptoms  Moderate hypoglycemia can cause:  Mental confusion and poor judgment.  Behavior changes.  Weakness.  Irregular heartbeat.  Severe symptoms  Severe hypoglycemia is a medical emergency. It can cause:  Fainting.  Seizures.  Loss of consciousness (coma).  Death.  How is this diagnosed?  Hypoglycemia is diagnosed with a blood test to measure your blood glucose level. This blood test is done while you are having symptoms. Your health care provider may also do a physical exam and review your medical history.  How is this treated?  This condition can often be treated by immediately eating or drinking something that contains sugar, such as:  Fruit juice, 4-6 oz (120-150 mL).  Regular soda (not diet soda), 4-6 oz (120-150 mL).  Low-fat milk, 4 oz (120 mL).  Several pieces of hard candy.  Sugar or honey, 1 Tbsp (15 mL).  Treating hypoglycemia if you have diabetes  If you are alert and able to swallow safely, follow the 15:15 rule:  Take 15 grams of a rapid-acting carbohydrate. Talk with your health care provider about how much you should take.  Rapid-acting options include:  Glucose pills (take 15 grams).  6-8 pieces of hard candy.  4-6 oz (120-150 mL) of fruit juice.  4-6 oz (120-150 mL)  of regular (not diet) soda.  1 Tbsp (15 mL) honey or sugar.  Check your blood glucose 15 minutes after you take the carbohydrate.  If the repeat blood glucose level is still at or below 70 mg/dL (3.9 mmol/L), take 15 grams of a carbohydrate again.  If your blood glucose level does not increase above 70 mg/dL (3.9 mmol/L) after 3 tries, seek emergency medical care.  After your blood glucose level returns to normal, eat a meal or a snack within 1 hour.    Treating severe hypoglycemia  Severe hypoglycemia is when your blood glucose level is at or below 54 mg/dL (3 mmol/L). Severe hypoglycemia is a medical emergency. Get medical help right away.  If you have severe hypoglycemia and you cannot eat or drink, you may need an injection of glucagon. A family member or close friend should learn how to check your blood glucose and how to give you a glucagon injection. Ask your health care provider if you need to have an emergency glucagon injection kit available.  Severe hypoglycemia may need to be treated in a hospital. The treatment may include getting glucose through an IV. You may also need treatment for the cause of your hypoglycemia.  Follow these instructions at home:    General instructions  Take over-the-counter and prescription medicines only as told by your health care provider.  Monitor your blood glucose as told by your health care provider.  Limit alcohol intake to no more than 1 drink a day for nonpregnant women and 2 drinks a day for men. One drink equals 12 oz of beer (355 mL), 5 oz of wine (148 mL), or 1½ oz of hard liquor (44 mL).  Keep all follow-up visits as told by your health care provider. This is important.  If you have diabetes:  Always have a rapid-acting carbohydrate snack with you to treat low blood glucose.  Follow your diabetes management plan as directed. Make sure you:  Know the symptoms of hypoglycemia. It is important to treat it right away to prevent it from becoming severe.  Take your  medicines as directed.  Follow your exercise plan.  Follow your meal plan. Eat on time, and do not skip meals.  Check your blood glucose as often as directed. Always check before and after exercise.  Follow your sick day plan whenever you cannot eat or drink normally. Make this plan in advance with your health care provider.  Share your diabetes management plan with people in your workplace, school, and household.  Check your urine for ketones when you are ill and as told by your health care provider.  Carry a medical alert card or wear medical alert jewelry.  Contact a health care provider if:  You have problems keeping your blood glucose in your target range.  You have frequent episodes of hypoglycemia.  Get help right away if:  You continue to have hypoglycemia symptoms after eating or drinking something containing glucose.  Your blood glucose is at or below 54 mg/dL (3 mmol/L).  You have a seizure.  You faint.  These symptoms may represent a serious problem that is an emergency. Do not wait to see if the symptoms will go away. Get medical help right away. Call your local emergency services (911 in the [place]  Summary  Hypoglycemia occurs when the level of sugar (glucose) in the blood is too low.  Hypoglycemia can happen in people who do or do not have diabetes. It can develop quickly, and it can be a medical emergency.  Make sure you know the symptoms of hypoglycemia and how to treat it.  Always have a rapid-acting carbohydrate snack with you to treat low blood sugar.  This information is not intended to replace advice given to you by your health care provider. Make sure you discuss any questions you have with your health care provider.  Document Released: 12/18/2006 Document Revised: 06/10/2019 Document Reviewed: 01/20/2017  [employer] Patient Education © 2020 [employer]    Seizure, Adult  A seizure is a sudden burst of abnormal electrical activity in the brain. Seizures usually last from 30 seconds to 2  minutes. The abnormal activity temporarily interrupts normal brain function. A seizure can cause many different symptoms depending on where in the brain it starts.  What are the causes?  Common causes of this condition include:  Fever or infection.  Brain abnormality, injury, bleeding, or tumor.  Low blood sugar.  Metabolic disorders or other conditions that are passed from parent to child (are inherited).  Reaction to a substance, such as a drug or a medicine, or suddenly stopping the use of a substance (withdrawal).  Stroke.  Developmental disorders such as autism or cerebral palsy.  In some cases, the cause of this condition may not be known. Some people who have a seizure never have another one. Seizures usually do not cause brain damage or permanent problems unless they are prolonged. A person who has repeated seizures over time without a clear cause has a condition called epilepsy.  What increases the risk?  You are more likely to develop this condition if you have:  A family history of epilepsy.  Had a tonic-clonic seizure in the past. This is a type of seizure that involves whole-body contraction of muscles and a loss of consciousness.  Autism, cerebral palsy, or other brain disorders.  A history of head trauma, lack of oxygen at birth, or strokes.  What are the signs or symptoms?  There are many different types of seizures. The symptoms of a seizure vary depending on the type of seizure you have. Examples of symptoms during a seizure include:  Uncontrollable shaking (convulsions).  Stiffening of the body.  Loss of consciousness.  Head nodding.  Staring.  Not responding to sound or touch.  Loss of bladder or bowel control.  Some people have symptoms right before a seizure happens (aura) and right after a seizure happens (postictal).  Symptoms before a seizure may include:  Fear or anxiety.  Nausea.  Feeling like the room is spinning (vertigo).  A feeling of having seen or heard something before (déjà  vu).  Odd tastes or smells.  Changes in vision, such as seeing flashing lights or spots.  Symptoms after a seizure may include:  Confusion.  Sleepiness.  Headache.  Weakness on one side of the body.  How is this diagnosed?  This condition may be diagnosed based on:  A description of your symptoms. Video of your seizures can be helpful.  Your medical history.  A physical exam.  You may also have tests, including:  Blood tests.  CT scan.  MRI.  Electroencephalogram (EEG). This test measures electrical activity in the brain. An EEG can predict whether seizures will return (recur).  A spinal tap (also called a lumbar puncture). This is the removal and testing of fluid that surrounds the brain and spinal cord.  How is this treated?  Most seizures will stop on their own in under 5 minutes, and no treatment is needed. Seizures that last longer than 5 minutes will usually need treatment. Treatment can include:  Medicines given through an IV.  Avoiding known triggers, such as medicines that you take for another condition.  Medicines to treat epilepsy (antiepileptics), if epilepsy caused your seizures.  Surgery to stop seizures, if you have epilepsy that does not respond to medicines.  Follow these instructions at home:  Medicines  Take over-the-counter and prescription medicines only as told by your health care provider.  Avoid any substances that may prevent your medicine from working properly, such as alcohol.  Activity  Do not drive, swim, or do any other activities that would be dangerous if you had another seizure. Wait until your health care provider says it is safe to do them.  If you live in the [place] check with your local DMV (department of motor vehicles) to find out about local driving laws. Each state has specific rules about when you can legally return to driving.  Get enough rest. Lack of sleep can make seizures more likely to occur.  Educating others  Teach friends and family what to do if you have a seizure.  They should:  Lay you on the ground to prevent a fall.  Cushion your head and body.  Loosen any tight clothing around your neck.  Turn you on your side. If vomiting occurs, this helps keep your airway clear.  Not hold you down. Holding you down will not stop the seizure.  Not put anything into your mouth.  Know whether or not you need emergency care. For example, they should get help right away if you have a seizure that lasts longer than 5 minutes or have several seizures in a row.  Stay with you until you recover.    General instructions  Contact your health care provider each time you have a seizure.  Avoid anything that has ever triggered a seizure for you.  Keep a seizure diary. Record what you remember about each seizure, especially anything that might have triggered the seizure.  Keep all follow-up visits as told by your health care provider. This is important.  Contact a health care provider if:  You have another seizure.  You have seizures more often.  Your seizure symptoms change.  You continue to have seizures with treatment.  You have symptoms of an infection or illness. This might increase your risk of having a seizure.  Get help right away if:  You have a seizure that:  Lasts longer than 5 minutes.  Is different than previous seizures.  Leaves you unable to speak or use a part of your body.  Makes it harder to breathe.  You have:  A seizure after a head injury.  Multiple seizures in a row.  Confusion or a severe headache right after a seizure.  You do not wake up immediately after a seizure.  You injure yourself during a seizure.  These symptoms may represent a serious problem that is an emergency. Do not wait to see if the symptoms will go away. Get medical help right away. Call your local emergency services (911 in the [place] Do not drive yourself to the hospital.  Summary  Seizures are caused by abnormal electrical activity in the brain. The activity disrupts normal brain function and can cause  various symptoms, such as convulsions, abnormal movements, or a change in consciousness.  There are many causes of seizures, including illnesses, medicines, genetic conditions, head injuries, strokes, tumors, substance abuse, or substance withdrawal.  Most seizures will stop on their own in under 5 minutes. Seizures that last longer than 5 minutes are a medical emergency and require immediate treatment.  Many medicines are used to treat seizures. Take over-the-counter and prescription medicines only as told by your health care provider.  This information is not intended to replace advice given to you by your health care provider. Make sure you discuss any questions you have with your health care provider.  Document Released: 12/15/2001 Document Revised: 03/06/2020 Document Reviewed: 03/06/2020  [employer] Patient Education © 2020 [employer]           no